# Patient Record
Sex: FEMALE | Race: WHITE | NOT HISPANIC OR LATINO | Employment: UNEMPLOYED | URBAN - METROPOLITAN AREA
[De-identification: names, ages, dates, MRNs, and addresses within clinical notes are randomized per-mention and may not be internally consistent; named-entity substitution may affect disease eponyms.]

---

## 2022-03-18 PROBLEM — Z34.90 PREGNANCY: Status: ACTIVE | Noted: 2019-12-27

## 2022-03-19 PROBLEM — Z34.90 TERM PREGNANCY: Status: ACTIVE | Noted: 2019-12-29

## 2023-01-14 ENCOUNTER — HOSPITAL ENCOUNTER (EMERGENCY)
Facility: HOSPITAL | Age: 28
Discharge: HOME | End: 2023-01-14
Attending: EMERGENCY MEDICINE
Payer: COMMERCIAL

## 2023-01-14 VITALS
SYSTOLIC BLOOD PRESSURE: 109 MMHG | HEIGHT: 61 IN | TEMPERATURE: 99 F | OXYGEN SATURATION: 100 % | RESPIRATION RATE: 20 BRPM | BODY MASS INDEX: 22.66 KG/M2 | HEART RATE: 79 BPM | DIASTOLIC BLOOD PRESSURE: 69 MMHG | WEIGHT: 120 LBS

## 2023-01-14 DIAGNOSIS — F32.9 MAJOR DEPRESSIVE DISORDER WITH CURRENT ACTIVE EPISODE, UNSPECIFIED DEPRESSION EPISODE SEVERITY, UNSPECIFIED WHETHER RECURRENT: Primary | ICD-10-CM

## 2023-01-14 DIAGNOSIS — F41.9 ANXIETY DISORDER, UNSPECIFIED TYPE: ICD-10-CM

## 2023-01-14 LAB
ALBUMIN SERPL BCP-MCNC: 4.8 G/DL (ref 3.5–5.2)
ALP SERPL-CCNC: 58 U/L (ref 55–135)
ALT SERPL W/O P-5'-P-CCNC: 36 U/L (ref 10–44)
AMPHET+METHAMPHET UR QL: NEGATIVE
ANION GAP SERPL CALC-SCNC: 11 MMOL/L (ref 8–16)
APAP SERPL-MCNC: <3 UG/ML (ref 10–20)
AST SERPL-CCNC: 27 U/L (ref 10–40)
B-HCG UR QL: NEGATIVE
BARBITURATES UR QL SCN>200 NG/ML: NEGATIVE
BASOPHILS # BLD AUTO: 0.01 K/UL (ref 0–0.2)
BASOPHILS NFR BLD: 0.1 % (ref 0–1.9)
BENZODIAZ UR QL SCN>200 NG/ML: NEGATIVE
BILIRUB SERPL-MCNC: 0.4 MG/DL (ref 0.1–1)
BILIRUB UR QL STRIP: NEGATIVE
BUN SERPL-MCNC: 8 MG/DL (ref 6–20)
BZE UR QL SCN: NEGATIVE
CALCIUM SERPL-MCNC: 10 MG/DL (ref 8.7–10.5)
CANNABINOIDS UR QL SCN: NEGATIVE
CHLORIDE SERPL-SCNC: 106 MMOL/L (ref 95–110)
CLARITY UR: CLEAR
CO2 SERPL-SCNC: 23 MMOL/L (ref 23–29)
COLOR UR: YELLOW
CREAT SERPL-MCNC: 0.7 MG/DL (ref 0.5–1.4)
CREAT UR-MCNC: 36 MG/DL (ref 15–325)
DIFFERENTIAL METHOD: ABNORMAL
EOSINOPHIL # BLD AUTO: 0 K/UL (ref 0–0.5)
EOSINOPHIL NFR BLD: 0.2 % (ref 0–8)
ERYTHROCYTE [DISTWIDTH] IN BLOOD BY AUTOMATED COUNT: 11.9 % (ref 11.5–14.5)
EST. GFR  (NO RACE VARIABLE): >60 ML/MIN/1.73 M^2
ETHANOL SERPL-MCNC: <10 MG/DL
GLUCOSE SERPL-MCNC: 94 MG/DL (ref 70–110)
GLUCOSE UR QL STRIP: NEGATIVE
HCT VFR BLD AUTO: 41.5 % (ref 37–48.5)
HGB BLD-MCNC: 14.2 G/DL (ref 12–16)
HGB UR QL STRIP: NEGATIVE
IMM GRANULOCYTES # BLD AUTO: 0.03 K/UL (ref 0–0.04)
IMM GRANULOCYTES NFR BLD AUTO: 0.3 % (ref 0–0.5)
KETONES UR QL STRIP: NEGATIVE
LEUKOCYTE ESTERASE UR QL STRIP: NEGATIVE
LYMPHOCYTES # BLD AUTO: 1.1 K/UL (ref 1–4.8)
LYMPHOCYTES NFR BLD: 11.3 % (ref 18–48)
MCH RBC QN AUTO: 31.6 PG (ref 27–31)
MCHC RBC AUTO-ENTMCNC: 34.2 G/DL (ref 32–36)
MCV RBC AUTO: 92 FL (ref 82–98)
METHADONE UR QL SCN>300 NG/ML: NEGATIVE
MONOCYTES # BLD AUTO: 0.3 K/UL (ref 0.3–1)
MONOCYTES NFR BLD: 3.5 % (ref 4–15)
NEUTROPHILS # BLD AUTO: 8.2 K/UL (ref 1.8–7.7)
NEUTROPHILS NFR BLD: 84.6 % (ref 38–73)
NITRITE UR QL STRIP: NEGATIVE
NRBC BLD-RTO: 0 /100 WBC
OPIATES UR QL SCN: NEGATIVE
PCP UR QL SCN>25 NG/ML: NEGATIVE
PH UR STRIP: 7 [PH] (ref 5–8)
PLATELET # BLD AUTO: 230 K/UL (ref 150–450)
PMV BLD AUTO: 9.5 FL (ref 9.2–12.9)
POTASSIUM SERPL-SCNC: 4.3 MMOL/L (ref 3.5–5.1)
PROT SERPL-MCNC: 8 G/DL (ref 6–8.4)
PROT UR QL STRIP: NEGATIVE
RBC # BLD AUTO: 4.5 M/UL (ref 4–5.4)
SARS-COV-2 RDRP RESP QL NAA+PROBE: NEGATIVE
SODIUM SERPL-SCNC: 140 MMOL/L (ref 136–145)
SP GR UR STRIP: 1 (ref 1–1.03)
TOXICOLOGY INFORMATION: NORMAL
TSH SERPL DL<=0.005 MIU/L-ACNC: 1.4 UIU/ML (ref 0.4–4)
URN SPEC COLLECT METH UR: NORMAL
UROBILINOGEN UR STRIP-ACNC: NEGATIVE EU/DL
WBC # BLD AUTO: 9.7 K/UL (ref 3.9–12.7)

## 2023-01-14 PROCEDURE — U0002 COVID-19 LAB TEST NON-CDC: HCPCS | Performed by: EMERGENCY MEDICINE

## 2023-01-14 PROCEDURE — 81025 URINE PREGNANCY TEST: CPT | Performed by: EMERGENCY MEDICINE

## 2023-01-14 PROCEDURE — 80053 COMPREHEN METABOLIC PANEL: CPT | Performed by: EMERGENCY MEDICINE

## 2023-01-14 PROCEDURE — 36415 COLL VENOUS BLD VENIPUNCTURE: CPT | Performed by: EMERGENCY MEDICINE

## 2023-01-14 PROCEDURE — 84443 ASSAY THYROID STIM HORMONE: CPT | Performed by: EMERGENCY MEDICINE

## 2023-01-14 PROCEDURE — G0427 INPT/ED TELECONSULT70: HCPCS | Mod: GT,,, | Performed by: STUDENT IN AN ORGANIZED HEALTH CARE EDUCATION/TRAINING PROGRAM

## 2023-01-14 PROCEDURE — 85025 COMPLETE CBC W/AUTO DIFF WBC: CPT | Performed by: EMERGENCY MEDICINE

## 2023-01-14 PROCEDURE — 80143 DRUG ASSAY ACETAMINOPHEN: CPT | Performed by: EMERGENCY MEDICINE

## 2023-01-14 PROCEDURE — 80307 DRUG TEST PRSMV CHEM ANLYZR: CPT | Performed by: EMERGENCY MEDICINE

## 2023-01-14 PROCEDURE — 82077 ASSAY SPEC XCP UR&BREATH IA: CPT | Performed by: EMERGENCY MEDICINE

## 2023-01-14 PROCEDURE — 81003 URINALYSIS AUTO W/O SCOPE: CPT | Mod: 59 | Performed by: EMERGENCY MEDICINE

## 2023-01-14 PROCEDURE — 99285 EMERGENCY DEPT VISIT HI MDM: CPT | Mod: 25

## 2023-01-14 PROCEDURE — G0427 PR INPT TELEHEALTH CON 70/>M: ICD-10-PCS | Mod: GT,,, | Performed by: STUDENT IN AN ORGANIZED HEALTH CARE EDUCATION/TRAINING PROGRAM

## 2023-01-14 NOTE — DISCHARGE INSTRUCTIONS
[facility] BEHAVIORAL HEALTH & ADDICTION RESOURCES      [place] [facility] Crisis Response Team:    Region 12 ([place], [place], [place], and [facility]) ([facility] and [facility])  [phone]       Outpatient Mental Health & Addiction Clinic Resources for both [facility] and [facility]:    [facility] Mental Healthcare Resources   Website: [email or web address]  Main Number: [phone]    [facility] ([facility])  [address] ([address] [facility]) [place] [address]  [phone]    [facility] ([facility])  [address] ([address] [facility]) [place] [address]  [phone]    [facility]   [address] ([address]) [place] [address]  [phone]    [facility]  [address] ([address] [facility]) [place] [address]  [phone]      Addiction Treatment Resources for both [facility] and [facility]:    [facility] (Detox, Residential, PHP, IOP, and Aftercare Programs)  [address] [place] [address]  [phone]    [facility] (Residential, IOP, Transitional Living, and Aftercare Programs)  [address] [facility] [place] [address]  [phone]    [facility] (Inpatient, Residential, Detox, IOP, Outpatient, and Aftercare Programs)  [address] [place] [address]  [phone] or toll free at [phone]      Outpatient Mental Health Psychotherapy Resources for both [facility] and [facility]:     Mar Gómez, LCSW  [address]  [place], [place] [address] [phone]  Specialties: Depression, Anxiety, and Life Transitions    Génesis Wong, PhD  [address]  [address]  [place] [address] [phone]  Specialties: Testing and Evaluation, Education and Learning Disabilities, and  ADHD    Nelida Epstein, [facility]  [facility]  [address] [place] [address]  [phone]  Specialties: Obsessive-Compulsive (OCD), Depression, and Relationship Issues    Nickie Harrison LPC  [address] [place] [address]  [phone]  Specialties: Trauma & PTSD, Mood Disorders, and Anxiety    Nickie Isaac, PhD, [facility]  [address] [place] [address]  [phone]  Specialties: Family Conflict, Child, and Relationship Issues    Carline Hardwick LPC  [facility] [address]  [phone]  Specialties: Anxiety, Depression, and Anger Management

## 2023-01-14 NOTE — ED NOTES
Discharge reviewed with pt. Instructed to follow up with psych and pcp, and return to the ED for worsening of symptoms. Pt voiced understanding, no distress noted.
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
## 2023-02-01 RX ORDER — ESCITALOPRAM OXALATE 20 MG/1
20 TABLET ORAL DAILY
COMMUNITY

## 2023-02-01 RX ORDER — IBUPROFEN 600 MG/1
600 TABLET ORAL EVERY 6 HOURS PRN
COMMUNITY
Start: 2019-12-31

## 2023-02-01 RX ORDER — OMEPRAZOLE 10 MG/1
10 CAPSULE, DELAYED RELEASE ORAL DAILY
COMMUNITY

## 2023-11-07 ENCOUNTER — OFFICE VISIT (OUTPATIENT)
Dept: OBSTETRICS AND GYNECOLOGY | Facility: CLINIC | Age: 28
End: 2023-11-07
Payer: MEDICAID

## 2023-11-07 VITALS
HEIGHT: 61 IN | BODY MASS INDEX: 20.39 KG/M2 | DIASTOLIC BLOOD PRESSURE: 70 MMHG | WEIGHT: 108 LBS | HEART RATE: 74 BPM | SYSTOLIC BLOOD PRESSURE: 100 MMHG

## 2023-11-07 DIAGNOSIS — R55 SYNCOPE, UNSPECIFIED SYNCOPE TYPE: ICD-10-CM

## 2023-11-07 DIAGNOSIS — Z3A.21 21 WEEKS GESTATION OF PREGNANCY: Primary | ICD-10-CM

## 2023-11-07 PROCEDURE — 3078F PR MOST RECENT DIASTOLIC BLOOD PRESSURE < 80 MM HG: ICD-10-PCS | Mod: CPTII,,, | Performed by: STUDENT IN AN ORGANIZED HEALTH CARE EDUCATION/TRAINING PROGRAM

## 2023-11-07 PROCEDURE — 3074F SYST BP LT 130 MM HG: CPT | Mod: CPTII,,, | Performed by: STUDENT IN AN ORGANIZED HEALTH CARE EDUCATION/TRAINING PROGRAM

## 2023-11-07 PROCEDURE — 99999 PR PBB SHADOW E&M-EST. PATIENT-LVL III: ICD-10-PCS | Mod: PBBFAC,,, | Performed by: STUDENT IN AN ORGANIZED HEALTH CARE EDUCATION/TRAINING PROGRAM

## 2023-11-07 PROCEDURE — 87086 URINE CULTURE/COLONY COUNT: CPT | Performed by: STUDENT IN AN ORGANIZED HEALTH CARE EDUCATION/TRAINING PROGRAM

## 2023-11-07 PROCEDURE — 99204 OFFICE O/P NEW MOD 45 MIN: CPT | Mod: TH,S$PBB,, | Performed by: STUDENT IN AN ORGANIZED HEALTH CARE EDUCATION/TRAINING PROGRAM

## 2023-11-07 PROCEDURE — 99213 OFFICE O/P EST LOW 20 MIN: CPT | Mod: PBBFAC,PO | Performed by: STUDENT IN AN ORGANIZED HEALTH CARE EDUCATION/TRAINING PROGRAM

## 2023-11-07 PROCEDURE — 1159F MED LIST DOCD IN RCRD: CPT | Mod: CPTII,,, | Performed by: STUDENT IN AN ORGANIZED HEALTH CARE EDUCATION/TRAINING PROGRAM

## 2023-11-07 PROCEDURE — 3074F PR MOST RECENT SYSTOLIC BLOOD PRESSURE < 130 MM HG: ICD-10-PCS | Mod: CPTII,,, | Performed by: STUDENT IN AN ORGANIZED HEALTH CARE EDUCATION/TRAINING PROGRAM

## 2023-11-07 PROCEDURE — 1160F RVW MEDS BY RX/DR IN RCRD: CPT | Mod: CPTII,,, | Performed by: STUDENT IN AN ORGANIZED HEALTH CARE EDUCATION/TRAINING PROGRAM

## 2023-11-07 PROCEDURE — 1160F PR REVIEW ALL MEDS BY PRESCRIBER/CLIN PHARMACIST DOCUMENTED: ICD-10-PCS | Mod: CPTII,,, | Performed by: STUDENT IN AN ORGANIZED HEALTH CARE EDUCATION/TRAINING PROGRAM

## 2023-11-07 PROCEDURE — 99999 PR PBB SHADOW E&M-EST. PATIENT-LVL III: CPT | Mod: PBBFAC,,, | Performed by: STUDENT IN AN ORGANIZED HEALTH CARE EDUCATION/TRAINING PROGRAM

## 2023-11-07 PROCEDURE — 1159F PR MEDICATION LIST DOCUMENTED IN MEDICAL RECORD: ICD-10-PCS | Mod: CPTII,,, | Performed by: STUDENT IN AN ORGANIZED HEALTH CARE EDUCATION/TRAINING PROGRAM

## 2023-11-07 PROCEDURE — 3008F PR BODY MASS INDEX (BMI) DOCUMENTED: ICD-10-PCS | Mod: CPTII,,, | Performed by: STUDENT IN AN ORGANIZED HEALTH CARE EDUCATION/TRAINING PROGRAM

## 2023-11-07 PROCEDURE — 3008F BODY MASS INDEX DOCD: CPT | Mod: CPTII,,, | Performed by: STUDENT IN AN ORGANIZED HEALTH CARE EDUCATION/TRAINING PROGRAM

## 2023-11-07 PROCEDURE — 99204 PR OFFICE/OUTPT VISIT, NEW, LEVL IV, 45-59 MIN: ICD-10-PCS | Mod: TH,S$PBB,, | Performed by: STUDENT IN AN ORGANIZED HEALTH CARE EDUCATION/TRAINING PROGRAM

## 2023-11-07 PROCEDURE — 3078F DIAST BP <80 MM HG: CPT | Mod: CPTII,,, | Performed by: STUDENT IN AN ORGANIZED HEALTH CARE EDUCATION/TRAINING PROGRAM

## 2023-11-07 RX ORDER — CLONAZEPAM 0.5 MG/1
0.5 TABLET ORAL DAILY PRN
COMMUNITY
Start: 2023-09-21

## 2023-11-07 RX ORDER — ESCITALOPRAM OXALATE 20 MG/1
20 TABLET ORAL
COMMUNITY
Start: 2023-09-21 | End: 2023-11-21 | Stop reason: SDUPTHER

## 2023-11-07 RX ORDER — LAMOTRIGINE 25 MG/1
50 TABLET ORAL NIGHTLY
COMMUNITY
Start: 2023-10-26

## 2023-11-07 RX ORDER — ONDANSETRON 4 MG/1
4 TABLET, FILM COATED ORAL EVERY 6 HOURS PRN
COMMUNITY
Start: 2023-10-12 | End: 2023-12-05

## 2023-11-07 NOTE — PROGRESS NOTES
"OB INTAKE APPOINTMENT    28-YO female presents today for her OB Intake Appointment.    Went to the ER due to fainting spells, worsening migraines and "completely blacking out".   She has not had these symptoms in her prior pregnancies. "Fainting spells are associated with chest pain, tunnel vision, and heat sensation" through her head.  Some of the symptoms were present before this pregnancy.   She has nausea after the episode.   She denies any underlying cardiac issues. She has not had these issues evaluated.   Taking klonopin PRN for anxiety attacks.   Lamictal daily managed by psychiatry for mood stability.     --------------------------------------------------------------------------------     PREGNANCY DATING:    Pregnancy test today = N/A     LMP 5-1-23 ---gives EDC---> Feb 5, 2024 ----> 27 w 1 d wks EGA today    Based on US results:   ROMAN MAR 18, 2024  EGA: 21 w 1 d     Sure LMP: No  Prior US done: Yes  Other information relevant to dating (sure conception date, IVF date, etc.): No      Fundal height: 21 cm  HT with Doppler: 130-140s.    Recent OB US: 9-14-23    Narrative    EXAM: OBSTETRICAL ULTRASOUND LESS than 14 weeks     History: , Syncopal episode with fall, now having abdominal cramping, ,     COMPARISON STUDIES AVAILABLE:  09/08/2023     Transabdominal ultrasound performed.     Findings:  single living intrauterine pregnancy   Presentation:  Transverse   Placenta:  Posterior   CERVIX: Closed 3.5 cm   Biometric Data:                               Head circumference:  9.4 cm                             Abdominal circumference:  7.2 cm                             Femur length:  0.99 cm                             Humerus length:  1.12 cm   Average ultrasound age:  13 weeks 3 days   Estimated Date of Delivery by US:  03/18/2024   Estimated fetal weight:  74.8 g   Amniotic Fluid:  Normal, AMY was not obtained   Fetal Growth Indices:  Normal   Heart Rate:  158 BPM     Fetal anatomic survey demonstrates " intracranial contents to be within normal limits. 4 chambered heart noted with normal right and left ventricular outflow tracts. Fetal spine unremarkable. Left-sided stomach. Limited visualization of the kidneys with no abnormality identified. Three-vessel cord with normal cord insertion. Both are normal. Urinary bladder normal. Both ovaries normal in size and appearance. No free fluid.     Maternal adnexa obscured by gestation..    OB HISTORY:    Term deliveries: 3    (<37wks) deliveries: 0   Vaginal deliveries: 3   C-sections: 0   Miscarriages (<20wks): 0   Stillbirths (>20wks): 0   Medical Terminations: 0   Surgical Terminations: 0   Ectopic pregnancies: 0   Living children: 2      =      MEDICATIONS CURRENTLY TAKING:  Promethazine 25 mg tablet   Lexapro 20 mg  Klonopin 0.5 mg PRN  Lamictal 25 mg      CARRIER SCREENING PREVIOUSLY DONE:  Cystic Fibrosis, Spinal Muscular Atrophy, Fragile X:  Prior screen reviewed.  The patient was negative for CF, SMA, and Fragile X.  Hemoglobinopathies: Prior testing reviewed.  The patient has no known hemoglobinopathies.  Other: N/A     FAMILY LINEAGE AND HISTORY:  Ashkenazi or North American Advent:  No  Intellectual disability:  No  Premature ovarian failure (<40yoa):  No  Cajun or Luxembourger Gilson:  No    CERVICAL CANCER SCREENING:  Latest PAP and/or HPV result: Low grade squamous intraepithelial lesion (Date: 7-)  Has the patient had any prior abnormal tests? Yes, she was told to come in and repeat a pap smear. They did not repeat a pap smear.      MISCELLANEOUS:  Does the patient have cats? No     --------------------------------------------------------------------------------     ASSESMENT & PLAN:  Pregnancy confirmed today with a positive pregnancy test.  Limited patient history and chart review completed as above.    Gave patient our OB Welcome Packet and reviewed its contents.  Our discussion included:  an overview of appointments, hydration /  "nutrition / weight gain advice, safe OTC medications, what substances and exposures to avoid, vaccine recommendations, advice for morning sickness, dental hygiene advice, recommendations regarding exercise, advice for travel in pregnancy, information about breastfeeding, postpartum birth control, and circumcision, pediatricians in the community, WIC enrollment, how to contact us for concerns or problems during pregnancy, warning or danger signs.  Also provided [facility]'s publication, "Your Pregnancy from A-Z."    Advised patient to enroll in [employer] if not already done.    Advised patient to start a prenatal vitamin if not already taking.  It should contain 600mcg folic acid, 27mg iron, and 200mg DHA.  Other medication management: continue Klonopin, lexapro and Lamictal.    The patient watched [facility]'s video about Genetic and Carrier Screening options in pregnancy.  Afterwards, this testing was discussed in-person, and she had an opportunity to ask questions.  She was encouraged to consider these optional tests and to review the detailed information available in the OB Welcome Packet. If she would like this testing done, we will order it at her NOB appointment (usually ~12wks).    Routine OB labs were ordered, and the patient was directed to the lab.    A second trimester anatomy ultrasound was ordered and scheduled for 11-14-23.    The patient filled out a complete medical history form, which will be reviewed by one of our physicians after this appointment.  Most patients will be scheduled for a New OB appointment with Dr. Arias or Dr. Peng around 12wks EGA.  Dr. Peng and came in to discuss the patient's symptoms. Since some of the issues have been present before pregnancy, there is a possibility there could be an underlying heart issue. Urgent referral to cardiology has been placed.   If the patient needs to be seen for care prior to that (based on her responses on the history form), our office will reach out " to her and make adjustments.      Melanie Melchor PA-C  11/7/2023

## 2023-11-08 ENCOUNTER — HOSPITAL ENCOUNTER (EMERGENCY)
Facility: HOSPITAL | Age: 28
Discharge: HOME | End: 2023-11-09
Attending: STUDENT IN AN ORGANIZED HEALTH CARE EDUCATION/TRAINING PROGRAM
Payer: MEDICAID

## 2023-11-08 DIAGNOSIS — R55 VASOVAGAL SYNCOPE: ICD-10-CM

## 2023-11-08 DIAGNOSIS — Z34.92 SECOND TRIMESTER PREGNANCY: Primary | ICD-10-CM

## 2023-11-08 LAB
ALBUMIN SERPL BCP-MCNC: 3.7 G/DL (ref 3.5–5.2)
ALP SERPL-CCNC: 35 U/L (ref 55–135)
ALT SERPL W/O P-5'-P-CCNC: 12 U/L (ref 10–44)
ANION GAP SERPL CALC-SCNC: 6 MMOL/L (ref 8–16)
AST SERPL-CCNC: 13 U/L (ref 10–40)
B-HCG UR QL: POSITIVE
BACTERIA #/AREA URNS HPF: ABNORMAL /HPF
BACTERIA UR CULT: NO GROWTH
BASOPHILS # BLD AUTO: 0.01 K/UL (ref 0–0.2)
BASOPHILS NFR BLD: 0.1 % (ref 0–1.9)
BILIRUB SERPL-MCNC: 0.2 MG/DL (ref 0.1–1)
BILIRUB UR QL STRIP: NEGATIVE
BUN SERPL-MCNC: 5 MG/DL (ref 6–20)
CALCIUM SERPL-MCNC: 8.9 MG/DL (ref 8.7–10.5)
CHLORIDE SERPL-SCNC: 104 MMOL/L (ref 95–110)
CLARITY UR: ABNORMAL
CO2 SERPL-SCNC: 23 MMOL/L (ref 23–29)
COLOR UR: YELLOW
CREAT SERPL-MCNC: 0.5 MG/DL (ref 0.5–1.4)
CTP QC/QA: YES
DIFFERENTIAL METHOD: ABNORMAL
EOSINOPHIL # BLD AUTO: 0.1 K/UL (ref 0–0.5)
EOSINOPHIL NFR BLD: 0.9 % (ref 0–8)
ERYTHROCYTE [DISTWIDTH] IN BLOOD BY AUTOMATED COUNT: 12.6 % (ref 11.5–14.5)
EST. GFR  (NO RACE VARIABLE): >60 ML/MIN/1.73 M^2
GLUCOSE SERPL-MCNC: 99 MG/DL (ref 70–110)
GLUCOSE UR QL STRIP: ABNORMAL
HCT VFR BLD AUTO: 32.4 % (ref 37–48.5)
HGB BLD-MCNC: 11.5 G/DL (ref 12–16)
HGB UR QL STRIP: NEGATIVE
HYALINE CASTS #/AREA URNS LPF: 7 /LPF
IMM GRANULOCYTES # BLD AUTO: 0.04 K/UL (ref 0–0.04)
IMM GRANULOCYTES NFR BLD AUTO: 0.5 % (ref 0–0.5)
KETONES UR QL STRIP: NEGATIVE
LEUKOCYTE ESTERASE UR QL STRIP: ABNORMAL
LIPASE SERPL-CCNC: 16 U/L (ref 4–60)
LYMPHOCYTES # BLD AUTO: 1.7 K/UL (ref 1–4.8)
LYMPHOCYTES NFR BLD: 22.8 % (ref 18–48)
MCH RBC QN AUTO: 33.5 PG (ref 27–31)
MCHC RBC AUTO-ENTMCNC: 35.5 G/DL (ref 32–36)
MCV RBC AUTO: 95 FL (ref 82–98)
MICROSCOPIC COMMENT: ABNORMAL
MONOCYTES # BLD AUTO: 0.6 K/UL (ref 0.3–1)
MONOCYTES NFR BLD: 8.1 % (ref 4–15)
NEUTROPHILS # BLD AUTO: 5.1 K/UL (ref 1.8–7.7)
NEUTROPHILS NFR BLD: 67.6 % (ref 38–73)
NITRITE UR QL STRIP: NEGATIVE
NRBC BLD-RTO: 0 /100 WBC
PH UR STRIP: 7 [PH] (ref 5–8)
PLATELET # BLD AUTO: 215 K/UL (ref 150–450)
PMV BLD AUTO: 8.9 FL (ref 9.2–12.9)
POTASSIUM SERPL-SCNC: 3.3 MMOL/L (ref 3.5–5.1)
PROT SERPL-MCNC: 6.4 G/DL (ref 6–8.4)
PROT UR QL STRIP: NEGATIVE
RBC # BLD AUTO: 3.43 M/UL (ref 4–5.4)
RBC #/AREA URNS HPF: 1 /HPF (ref 0–4)
SODIUM SERPL-SCNC: 133 MMOL/L (ref 136–145)
SP GR UR STRIP: 1.01 (ref 1–1.03)
SQUAMOUS #/AREA URNS HPF: 28 /HPF
URN SPEC COLLECT METH UR: ABNORMAL
UROBILINOGEN UR STRIP-ACNC: NEGATIVE EU/DL
WBC # BLD AUTO: 7.5 K/UL (ref 3.9–12.7)
WBC #/AREA URNS HPF: 4 /HPF (ref 0–5)

## 2023-11-08 PROCEDURE — 85025 COMPLETE CBC W/AUTO DIFF WBC: CPT

## 2023-11-08 PROCEDURE — 80053 COMPREHEN METABOLIC PANEL: CPT

## 2023-11-08 PROCEDURE — 81001 URINALYSIS AUTO W/SCOPE: CPT | Performed by: STUDENT IN AN ORGANIZED HEALTH CARE EDUCATION/TRAINING PROGRAM

## 2023-11-08 PROCEDURE — 81025 URINE PREGNANCY TEST: CPT | Performed by: STUDENT IN AN ORGANIZED HEALTH CARE EDUCATION/TRAINING PROGRAM

## 2023-11-08 PROCEDURE — 25000003 PHARM REV CODE 250: Mod: UD | Performed by: STUDENT IN AN ORGANIZED HEALTH CARE EDUCATION/TRAINING PROGRAM

## 2023-11-08 PROCEDURE — 83690 ASSAY OF LIPASE: CPT

## 2023-11-08 RX ORDER — ACETAMINOPHEN 500 MG
1000 TABLET ORAL
Status: COMPLETED | OUTPATIENT
Start: 2023-11-09 | End: 2023-11-09

## 2023-11-08 RX ADMIN — SODIUM CHLORIDE 1000 ML: 0.9 INJECTION, SOLUTION INTRAVENOUS at 11:11

## 2023-11-09 ENCOUNTER — PATIENT MESSAGE (OUTPATIENT)
Dept: OBSTETRICS AND GYNECOLOGY | Facility: CLINIC | Age: 28
End: 2023-11-09
Payer: MEDICAID

## 2023-11-09 VITALS
DIASTOLIC BLOOD PRESSURE: 66 MMHG | OXYGEN SATURATION: 98 % | TEMPERATURE: 98 F | SYSTOLIC BLOOD PRESSURE: 94 MMHG | BODY MASS INDEX: 20.2 KG/M2 | HEART RATE: 78 BPM | WEIGHT: 107 LBS | RESPIRATION RATE: 20 BRPM | HEIGHT: 61 IN

## 2023-11-09 PROCEDURE — 96360 HYDRATION IV INFUSION INIT: CPT

## 2023-11-09 PROCEDURE — 25000003 PHARM REV CODE 250

## 2023-11-09 PROCEDURE — 99284 EMERGENCY DEPT VISIT MOD MDM: CPT | Mod: 25

## 2023-11-09 RX ADMIN — ACETAMINOPHEN 1000 MG: 500 TABLET ORAL at 12:11

## 2023-11-09 NOTE — ED PROVIDER NOTES
"Encounter Date: 2023       History     Chief Complaint   Patient presents with    Abdominal Pain     Abdominal cramping she was cleared by L&D     28-year-old female  at proximally 22 weeks gestational age presents now for syncopal episode and abdominal cramping.  She has history of anxiety and rubin quit syncopal episodes preceded by dizziness, lightheadedness, feeling hot and flushed.  Today, she was anxious for unknown reason, went to her friend's house, started to feel dizzy, walked to the kitchen, felt flushed, lightheaded and passed out.  She awoke soon after and has felt normal since aside from intermittent "tunnel vision".       Review of patient's allergies indicates:   Allergen Reactions    Clindamycin Other (See Comments) and Rash     Past Medical History:   Diagnosis Date    Abnormal Pap smear of cervix      No past surgical history on file.  No family history on file.  Social History     Tobacco Use    Smoking status: Some Days     Types: Vaping with nicotine    Smokeless tobacco: Never   Substance Use Topics    Alcohol use: Not Currently    Drug use: Never     Review of Systems   Constitutional:  Negative for activity change, appetite change, chills, fever and unexpected weight change.   HENT:  Negative for dental problem and drooling.    Eyes:  Negative for discharge and itching.   Respiratory:  Negative for cough, chest tightness, shortness of breath, wheezing and stridor.    Cardiovascular:  Negative for chest pain, palpitations and leg swelling.   Gastrointestinal:  Positive for abdominal distention. Negative for abdominal pain, diarrhea and nausea.   Genitourinary:  Negative for difficulty urinating, dysuria, frequency and urgency.   Musculoskeletal:  Negative for back pain, gait problem and joint swelling.   Neurological:  Positive for syncope. Negative for dizziness, numbness and headaches.   Psychiatric/Behavioral:  Negative for agitation, behavioral problems and confusion.  "       Physical Exam     Initial Vitals [11/08/23 2212]   BP Pulse Resp Temp SpO2   103/65 81 20 98.1 °F (36.7 °C) 99 %      MAP       --         Physical Exam    Nursing note and vitals reviewed.  Constitutional: She appears well-developed and well-nourished. She is not diaphoretic.   HENT:   Head: Normocephalic and atraumatic.   Mouth/Throat: Oropharynx is clear and moist.   Eyes: EOM are normal. Pupils are equal, round, and reactive to light. Right eye exhibits no discharge. Left eye exhibits no discharge.   Neck: No tracheal deviation present.   Normal range of motion.  Cardiovascular:  Normal rate, regular rhythm and intact distal pulses.           Pulmonary/Chest: No respiratory distress. She has no wheezes. She exhibits no tenderness.   Abdominal: Abdomen is soft. She exhibits distension. She exhibits no mass. There is no abdominal tenderness.   Gravid, soft uterus There is no rebound and no guarding.   Musculoskeletal:         General: No tenderness or edema. Normal range of motion.      Cervical back: Normal range of motion.     Neurological: She is alert and oriented to person, place, and time. She has normal strength and normal reflexes. No cranial nerve deficit or sensory deficit. Coordination and gait normal. GCS eye subscore is 4. GCS verbal subscore is 5. GCS motor subscore is 6.   Normal finger to nose, heel to shin, rapid alternating movement.    Skin: Skin is warm and dry. No rash noted.   Psychiatric: She has a normal mood and affect. Her behavior is normal. Thought content normal.         ED Course   Procedures  Labs Reviewed   CBC W/ AUTO DIFFERENTIAL - Abnormal; Notable for the following components:       Result Value    RBC 3.43 (*)     Hemoglobin 11.5 (*)     Hematocrit 32.4 (*)     MCH 33.5 (*)     MPV 8.9 (*)     All other components within normal limits   COMPREHENSIVE METABOLIC PANEL - Abnormal; Notable for the following components:    Sodium 133 (*)     Potassium 3.3 (*)     BUN 5 (*)      Alkaline Phosphatase 35 (*)     Anion Gap 6 (*)     All other components within normal limits   URINALYSIS, REFLEX TO URINE CULTURE - Abnormal; Notable for the following components:    Appearance, UA Hazy (*)     Glucose, UA 1+ (*)     Leukocytes, UA Trace (*)     All other components within normal limits    Narrative:     Specimen Source->Urine   URINALYSIS MICROSCOPIC - Abnormal; Notable for the following components:    Hyaline Casts, UA 7 (*)     All other components within normal limits    Narrative:     Specimen Source->Urine   POCT URINE PREGNANCY - Abnormal; Notable for the following components:    POC Preg Test, Ur Positive (*)     All other components within normal limits   LIPASE   LIPASE          Imaging Results    None          Medications   sodium chloride 0.9% bolus 1,000 mL 1,000 mL (0 mLs Intravenous Stopped 11/9/23 0056)   acetaminophen tablet 1,000 mg (1,000 mg Oral Given 11/9/23 0001)     Medical Decision Making  Amount and/or Complexity of Data Reviewed  Labs: ordered.                             28-year-old female at 22 weeks gestational age presents for evaluation of syncope and lower abdominal cramping.  Initially she was seen at  and D, discharged after reassuring fetal heart tones and NST.  Vitals within normal limits.      Differential diagnosis includes vasovagal, anemia, arrhythmia, valvular disease, hypoglycemia, seizure.     After complete evaluation, including thorough history and physical exam, I feel the patient's symptoms are due to benign cause of syncope, most likely vasovagal. The episode was temporary, short in duration, and the patient is awake and back to baseline mental status. The history and presentation is inconsistent with seizure. Cardiac evaluation does not suggest arrhythmia, and patient's vitals have remained stable. Patient does not have history of congestive heart failure, the patient is not elderly, they have no history of dialysis or renal failure to suggest  high-risk syncope. The patient denies any concerning associated symptoms. Patient denies any headache, and presentation is inconsistent with SAH/intracranial bleed. Physical exam is benign without focal weakness, sensory deficit, or cerebellar signs to suggest stroke or intracranial mass. There is no meningismus, fever, or evidence of infection to suggest infection, meningitis/encephalitis. There's no history of sudden cardiac death or otherwise unexplained death at a young age to suggest familial cardiac disease. Hgb within normal limits, doubt life threatening anemia. There's no abdominal pain to suggest ruptured AAA.     Patient was advised to follow-up with their PCP in 2-3 days. Patient has maintained a normal blood pressure during ED stay. Appropriate discharge instructions and return precautions were given. Patient and any present family expressed understanding.Patient was advised to follow-up with their PCP in 2-3 days. Appropriate discharge instructions and return precautions were given. Patient and any present family expressed understanding.    Clinical Impression:   Final diagnoses:  [R55] Vasovagal syncope  [Z34.92] Second trimester pregnancy (Primary)        ED Disposition Condition    Discharge Stable          ED Prescriptions    None       Follow-up Information       Follow up With Specialties Details Why Contact Info    Pilo Peng MD Obstetrics and Gynecology Call in 1 day To recheck today's symptoms, To set up a follow-up appointment [facility]  [address]  [facility] [address]  [phone]               Daron Fontaine MD  11/09/23 0344

## 2023-11-09 NOTE — TELEPHONE ENCOUNTER
Contacted pt and notified her that her appt with us next week is the soonest we have available and she voiced understanding. Also explained that she can contact her insurance company to get a list of covered cardiologist and we can create a referral for a covered provider. Pt voiced understanding.

## 2023-11-09 NOTE — DISCHARGE INSTRUCTIONS

## 2023-11-14 ENCOUNTER — HOSPITAL ENCOUNTER (OUTPATIENT)
Dept: OBSTETRICS AND GYNECOLOGY | Facility: CLINIC | Age: 28
Discharge: HOME | End: 2023-11-14
Payer: MEDICAID

## 2023-11-14 ENCOUNTER — LAB VISIT (OUTPATIENT)
Dept: LAB | Facility: HOSPITAL | Age: 28
End: 2023-11-14
Attending: STUDENT IN AN ORGANIZED HEALTH CARE EDUCATION/TRAINING PROGRAM
Payer: MEDICAID

## 2023-11-14 DIAGNOSIS — Z3A.21 21 WEEKS GESTATION OF PREGNANCY: ICD-10-CM

## 2023-11-14 PROCEDURE — 83020 HEMOGLOBIN ELECTROPHORESIS: CPT | Performed by: STUDENT IN AN ORGANIZED HEALTH CARE EDUCATION/TRAINING PROGRAM

## 2023-11-14 PROCEDURE — 76805 US OB/GYN EXTENDED PROCEDURE (VIEWPOINT): ICD-10-PCS | Mod: 26,,, | Performed by: OBSTETRICS & GYNECOLOGY

## 2023-11-14 PROCEDURE — 36415 COLL VENOUS BLD VENIPUNCTURE: CPT | Mod: PO | Performed by: STUDENT IN AN ORGANIZED HEALTH CARE EDUCATION/TRAINING PROGRAM

## 2023-11-14 PROCEDURE — 76805 OB US >/= 14 WKS SNGL FETUS: CPT | Mod: 26,,, | Performed by: OBSTETRICS & GYNECOLOGY

## 2023-11-15 ENCOUNTER — INITIAL PRENATAL (OUTPATIENT)
Dept: OBSTETRICS AND GYNECOLOGY | Facility: CLINIC | Age: 28
End: 2023-11-15
Payer: MEDICAID

## 2023-11-15 VITALS
BODY MASS INDEX: 20.74 KG/M2 | DIASTOLIC BLOOD PRESSURE: 70 MMHG | WEIGHT: 109.81 LBS | SYSTOLIC BLOOD PRESSURE: 102 MMHG

## 2023-11-15 DIAGNOSIS — Z3A.22 22 WEEKS GESTATION OF PREGNANCY: Primary | ICD-10-CM

## 2023-11-15 DIAGNOSIS — L98.9 SKIN LESIONS: ICD-10-CM

## 2023-11-15 DIAGNOSIS — R55 SYNCOPE, UNSPECIFIED SYNCOPE TYPE: ICD-10-CM

## 2023-11-15 LAB
HGB A2 MFR BLD HPLC: 2.8 % (ref 2.2–3.2)
HGB FRACT BLD ELPH-IMP: NORMAL
HGB FRACT BLD ELPH-IMP: NORMAL

## 2023-11-15 PROCEDURE — 99999 PR PBB SHADOW E&M-EST. PATIENT-LVL III: ICD-10-PCS | Mod: PBBFAC,,, | Performed by: OBSTETRICS & GYNECOLOGY

## 2023-11-15 PROCEDURE — 99213 OFFICE O/P EST LOW 20 MIN: CPT | Mod: PBBFAC,PO | Performed by: OBSTETRICS & GYNECOLOGY

## 2023-11-15 PROCEDURE — 87491 CHLMYD TRACH DNA AMP PROBE: CPT | Performed by: OBSTETRICS & GYNECOLOGY

## 2023-11-15 PROCEDURE — 99215 OFFICE O/P EST HI 40 MIN: CPT | Mod: TH,S$PBB,, | Performed by: OBSTETRICS & GYNECOLOGY

## 2023-11-15 PROCEDURE — 99215 PR OFFICE/OUTPT VISIT, EST, LEVL V, 40-54 MIN: ICD-10-PCS | Mod: TH,S$PBB,, | Performed by: OBSTETRICS & GYNECOLOGY

## 2023-11-15 PROCEDURE — 99999 PR PBB SHADOW E&M-EST. PATIENT-LVL III: CPT | Mod: PBBFAC,,, | Performed by: OBSTETRICS & GYNECOLOGY

## 2023-11-16 ENCOUNTER — TELEPHONE (OUTPATIENT)
Dept: CARDIOLOGY | Facility: CLINIC | Age: 28
End: 2023-11-16
Payer: MEDICAID

## 2023-11-16 ENCOUNTER — TELEPHONE (OUTPATIENT)
Dept: OBSTETRICS AND GYNECOLOGY | Facility: CLINIC | Age: 28
End: 2023-11-16
Payer: MEDICAID

## 2023-11-16 LAB
C TRACH DNA SPEC QL NAA+PROBE: NOT DETECTED
N GONORRHOEA DNA SPEC QL NAA+PROBE: NOT DETECTED

## 2023-11-16 NOTE — TELEPHONE ENCOUNTER
----- Message from Hollie Ibarra sent at 11/16/2023  4:00 PM CST -----  Regarding: sooner apt  Contact: pt  Type:  Sooner Appointment Request    Caller is requesting a sooner appointment.  Caller declined first available appointment listed below.  Caller will not accept being placed on the waitlist and is requesting a message be sent to doctor.    Name of Caller:  Patient  When is the first available appointment?  r  Symptoms:  referral  Would the patient rather a call back or a response via [facility]?   [facility] Call Back Number:  [phone]    Additional Information:  call to have pt scheduled thanks!

## 2023-11-16 NOTE — TELEPHONE ENCOUNTER
Left message on voicemail to contact office in regard to cardiology appt.  Scheduled pt with Dr Cruz, cardiologist, on December 7 @ 10:00.  Demographics faxed to office at [phone].

## 2023-11-16 NOTE — TELEPHONE ENCOUNTER
Pt returned call and was notified of cardiology appt 12/7/23 @ 10:00. Phone number and address also provided to pt.  Pt voiced understanding.

## 2023-11-16 NOTE — TELEPHONE ENCOUNTER
Spoke to pt and advised clinic does not take [place] Medicaid. Pt already has appt with Dr Cruz in [place]

## 2023-11-17 ENCOUNTER — PATIENT MESSAGE (OUTPATIENT)
Dept: OBSTETRICS AND GYNECOLOGY | Facility: CLINIC | Age: 28
End: 2023-11-17
Payer: MEDICAID

## 2023-11-17 ENCOUNTER — TELEPHONE (OUTPATIENT)
Dept: OBSTETRICS AND GYNECOLOGY | Facility: CLINIC | Age: 28
End: 2023-11-17
Payer: MEDICAID

## 2023-11-17 RX ORDER — ASPIRIN 81 MG/1
81 TABLET ORAL DAILY
Qty: 90 TABLET | Refills: 1 | Status: ON HOLD | OUTPATIENT
Start: 2023-11-17 | End: 2024-03-04 | Stop reason: HOSPADM

## 2023-11-17 NOTE — TELEPHONE ENCOUNTER
----- Message from Pilo Peng MD sent at 11/17/2023  9:59 AM CST -----  Regarding: [employer] or other aneuploidy screening?  The patient's medical record is somewhat confusing as she is a late transfer.  Can you contact this patient and find out whether she had aneuploidy screening during this pregnancy (the test which gives the baby sex as well as chromosome analysis of the fetus)  I believe she had carrier screening done with previous pregnancies.

## 2023-11-17 NOTE — TELEPHONE ENCOUNTER
Contacted pt and notified she has a cardiology appt scheduled 11/22 with Dr Mead.  Pt voiced understanding. Also asked pt about screening test.  Pt states she did have a [employer] carrier screening done with this pregnancy through [facility].  She is looking into getting those results.

## 2023-11-17 NOTE — PROGRESS NOTES
Prenatal Note   Chief Complaint:  Initial Prenatal Visit       Patient ID: Josefa Oliver is a  28 y.o. female.    Date: 11/15/2023    INITIAL OB EVALUATION    S/ 28 y.o. at 22-1/7 weeks who presents for her initial OB evaluation.    Appropriate fetal movement noted.    No significant contractions.  No vaginal bleeding or rupture of membranes.    She currently reports continued issues with syncopal episodes.  She has been seen in the emergency room recently and was diagnosed with vasovagal issues.  To some extent these syncopal episodes predates the pregnancy.  Cardiology consultation was ordered but there have been some insurance issues.    In addition the patient has a long history of depression.  She is under the care of TOM Leiav in [facility] ([phone]).    She reports a skin lesion on her back by her left shoulder which has been increasing in size as well as a new lesion on her left thigh.      O/  VITALS:  Weight:  110 lb  BP:  102/70    FH: 22 cm  FHT'S: 150's bpm by doppler    The patient has an approximately 5 mm to 10 mm raised pinkish lesion on her back at the site of her left shoulder.  In addition there was a smaller similar lesion on her left thigh.  .    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    The patient's chart as well as past medical surgical and obstetrical history were reviewed and discussed with the patient.     We discussed the results of the patient's ultrasound and recent prenatal labs.   We reviewed her last menstrual and viability dating ultrasound  results.  We discussed her estimated due date of:  3/19/2024 which was based on a first-trimester ultrasound.  We discussed plans for follow-up ultrasound.    Blood testing for aneuploidy screening as well as carrier status was reviewed and discussed.  The patient reports negative carrier screening as well as negative aneuploidy screening with this pregnancy.    At today's visit, the patient completed the OB patient aspirin questionnaire.  Based on the results of the question air the patient is a candidate for aspirin prophylaxis due to:  A family history of preeclampsia (mother and sister) and the fact that she weighed less than 5-1/2 lb at the time of her personal premature delivery.    The patient was given the [place] Prenatal /  depression scale test today.    She scored:  Significantly high.  This is primarily to the fact that tomorrow is the birthday of the patient's oldest child who is now .  She is currently under the care of psychiatry and at this time denies any suicidal or homicidal thoughts.  She is currently on antidepressant medications.  We discussed her current depressive issues and she is following up as mentioned with psychiatry.  No medication adjustments at this time.    Risks of vaping/nicotine in pregnancy reviewed and discussed.    We had previously discussed and once again discussed the patient's issues with syncopal/vasovagal problems.  We will endeavor to have her seen by Cardiology as soon as possible.    The patient has an approximately 5 mm to 10 mm raised pinkish lesion on her back at the site of her left shoulder.  In addition there was a smaller similar lesion on her left thigh.  Skin changes in pregnancy were discussed and referral to dermatology has been placed.    We discussed her reported history of HSV and plans for prophylaxis beginning at 36 weeks    We discussed findings of LGSIL on her Pap smear and plans for repeat Pap smear following  delivery.    Influenza vaccine (October to May) & COVID vaccine recommended any time in pregnancy    See end of note for additional documentation.     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    INITIAL LABS:    MBT: A positive  AB SCREEN: negative  RPR: negative  RUBELLA: Immune  HEPATITIS A/B/C: negative  HIV: negative  CBC: DATE: 11/8/2023         7.5 >--- 11.5 / 32.4 ---< 215  HGB: normal  URINE CX: negative  Other:      10-12 WEEK LABS:    PAP: low-grade squamous intraepithelial neoplasia (LGSIL - encompassing HPV,mild dysplasia,RENA I) / Date: 7/10/2023    ALLA/CHLAM: negative x 2    cfDNA ([id number]):  Negative per patient    CARRIER SCREEN (Inheritest Core):  Patient reports previous testing for negative carrier status   28 WEEK LABS:         OTHER LABS:     OTHER ULTRASOUNDS:     TO-DO THROUGHOUT PREGNANCY:    Depression Screen (20wks): ___    Rhogam: ___  Influenza vaccine (OCT-MAY): ___  TDAP (27-36wks): ___    Birth Plan: ___  Plans to breastfeed: ___  Plans for epidural: ___  Classes at hospital: ___    Postpartum contraception: ___  Consent for delivery: ___  TOLAC counseling: ___  BTL counseling: ___   MEDICATIONS:    Prenatal vitamins  Promethazine 25 mg tablet    Lexapro 20 mg  Klonopin 0.5 mg PRN  Lamictal 25 mg  ALLERGIES:    Clindamycin: Shortness of breath and hives    MEDICAL HISTORY:    Depression, anxiety and PTSD.  Pre-pregnancy BMI = 19.3 SURGICAL HISTORY:    Negative    SOCIAL HISTORY:    Smoker:  Positive for vaping  Alcohol: denies  Drugs: denies  Relationship: co-habitating  Domestic Violence: no  Lives with:  FOB and four children  Education Level: Some College  Occupation: homemaker  Buddhist:  Hindu  Other:   GYN HISTORY:    PAP'S: no h/o abnormals  STI'S: no past history  GENITAL HSV:  The patient reports previous positive testing but no reported outbreaks.  She was placed on HSV prophylaxis with previous pregnancies FAMILY HISTORY:    HTN: Yes - mother  DIABETES: Yes - mother and sibling  BLEEDING D/O: No  CLOTTING D/O: No  BIRTH DEFECTS: No  MENTAL DISABILITY: No  TWINS OR MORE: No  GYN CANCER: No  Other:     OBSTETRIC HISTORY   Month/Year Mode of Delivery EGA Wt. M/F Complications / Comments   2015    Male Now    2018    Male    2019    Female                      Plan:      22 weeks gestation of pregnancy  -     C. trachomatis/N. gonorrhoeae by AMP DNA [facility]; Urine  -     aspirin (ECOTRIN) 81 MG EC tablet; Take 1 tablet (81 mg total) by mouth once daily.  Dispense: 90 tablet; Refill: 1    Syncope, unspecified syncope type  -     Ambulatory referral/consult to Cardiology; Future; Expected date: 2023    Skin lesions  -     Ambulatory referral/consult to Dermatology; Future; Expected date: 2023         No follow-ups on file.     Pilo Peng MD  Department OBGYN [facility]      FAMILY LINEAGE AND HISTORY:  Ashkenazi or North American Zoroastrianism:  No  Intellectual disability:  No  Premature ovarian failure (<40yoa):  No  Cajun or Kazakh Lipscomb:  No    MISCELLANEOUS:  Does the patient have cats? No        Date:      Dating  ======  Previous Ultrasound on: 2023  Type of prior assessment:  CRL  U/S measurement at prior assessment date 64.0 mm  GA by previous U/S 22 w + 3 d  ROMAN by previous Ultrasound: 3/16/2024  Ultrasound examination on: 11/14/2023  GA by U/S based upon: AC, BPD, Femur, HC  GA by U/S 21 w + 3 d  ROMAN by U/S: 3/23/2024  Assigned: based on ultrasound (CRL), selected on 11/14/2023  Assigned GA 22 w + 3 d  Assigned ROMAN: 3/16/2024    General Evaluation  ==============  Cardiac activity present.  bpm. Fetal movements: visualized. Presentation: breech  Placenta: Placental site: posterior. Placental edge-to-cervical os distance 42 mm. suboptimal for placental edge to cervix per transabdominal ultrasound.  Umbilical cord: Cord vessels: 3 vessel cord. Insertion site: normal insertion  Amniotic fluid: Amount of AF: normal amount per qualitative assessment    Fetal Biometry  ============  Standard  BPD 50.7 mm 21w 3d Hadlock  .6 mm 21w 2d Hadlock  Cerebellum tr 22.0 mm 21w 3d Moreno  .4 mm 21w 2d Hadlock  Femur 36.4 mm 21w 4d Hadlock  Humerus 35.9 mm 22w 4d Eric  HC / AC 1.17   g 20w 2d 13% Taiwo  EFW (lb) 0 lb  EFW (oz) 15 oz  EFW by: Hadlock (BPD-HC-AC-FL)  Extended  CM 4.0 mm  Nasal bone 5.6 mm  Extremities / Bony Struc  FL / BPD 0.72  FL / HC 0.19  FL / AC 0.23  Other Structures   bpm    Fetal Anatomy  ===========  Cranium: normal  Lateral ventricles: normal  Choroid plexus: normal  Midline falx: normal  Cavum septi pellucidi: normal  Cerebellum: normal  Cisterna magna: normal  Lips: normal  Profile: normal  Nose: normal  Face  Orbits: visualized  Lens: visualized  4-chamber view: normal  RVOT view: suboptimal  LVOT view: normal  3-vessel view: normal  3-vessel-trachea view: suboptimal  Heart / Thorax  Situs: situs solitus (normal)  Diaphragm: suboptimal  Cord insertion: suboptimal  Stomach: normal  Kidneys: normal  Bladder: normal  Genitals: normal  Abdomen  Abdom. wall: suboptimal  Bowel: normal  Cervical spine: normal  Thoracic spine:  normal  Lumbar spine: suboptimal  Sacral spine: suboptimal  Spine  Lumbar spine: sagittal bones appear wnl; sagittal skin line suboptimal  Sacral spine: sagittal bones appear wnl; sagittal skin line suboptimal  Spine: transverse views of spine suboptimal for skin line  Rt arm: normal  Lt arm: normal  Rt leg: normal  Lt leg: normal  Position of hands: normal  Position of feet: normal  Fetal sex: female  Wants to know fetal sex: yes    Maternal Structures  ===============  Uterus / Cervix  Uterus: Normal  Cervix: Visualized  Approach: Transabdominal  Cervical length 41.3 mm  Ovaries / Tubes / Adnexa  Rt ovary: Visualized  Lt ovary: Not visualized    Impression  =========  read only ultrasound:  1. 22w 3d calderon intrauterine pregnancy  2. Interval fetal growth wnl with EFW = 419 gms at 13% and AC 11%  3. No fetal structural abnormalities appreciated but incomplete fetal anatomical survey  4. Amniotic fluid volume wnl per qualitative assessment  5. Posterior placenta and placental edge to cervix appears wnl per transabdominal ultrasound but suboptimal visualization  6. Cervical length screen via transabdominal ultrasound wnl

## 2023-11-17 NOTE — TELEPHONE ENCOUNTER
----- Message from Pilo Peng MD sent at 11/17/2023  4:50 PM CST -----  Regarding: Note  Please provide this patient with a letter stating that due to her pregnancy and additional medical issues it is not recommended that she travel be on 2 hours from her home.  Contact the patient and ask her if this would be sufficient.    Officially I tried to keep these letters vague as it is really not anybody else is business regarding the patient's health as long as we notify them of our recommended restrictions.  We can further discuss on Monday.    Thanks.  SP

## 2023-11-17 NOTE — LETTER
November 17, 2023      [facility]  [address]  [address]  [place] [address]  Phone: [phone]  Fax: [phone]       Patient: Josefa Oliver   YOB: 1995      To Whom It May Concern:    Josefa Oliver is under our care at [facility]. Due to pregnancy and additional medical issues it is not recommended that she travel more than 2 hours from her home.  If you have any questions or concerns, or if I can be of further assistance, please do not hesitate to contact me.    Sincerely,      Pilo Peng MD/  Tricia Boyle RN

## 2023-11-20 ENCOUNTER — PATIENT MESSAGE (OUTPATIENT)
Dept: ADMINISTRATIVE | Facility: OTHER | Age: 28
End: 2023-11-20
Payer: MEDICAID

## 2023-11-21 ENCOUNTER — PATIENT MESSAGE (OUTPATIENT)
Dept: OBSTETRICS AND GYNECOLOGY | Facility: CLINIC | Age: 28
End: 2023-11-21
Payer: MEDICAID

## 2023-11-21 DIAGNOSIS — O99.342 DEPRESSION AFFECTING PREGNANCY IN SECOND TRIMESTER, ANTEPARTUM: Primary | ICD-10-CM

## 2023-11-21 DIAGNOSIS — F32.A DEPRESSION AFFECTING PREGNANCY IN SECOND TRIMESTER, ANTEPARTUM: Primary | ICD-10-CM

## 2023-11-21 RX ORDER — ESCITALOPRAM OXALATE 20 MG/1
20 TABLET ORAL DAILY
Qty: 90 TABLET | Refills: 3 | Status: SHIPPED | OUTPATIENT
Start: 2023-11-21 | End: 2024-11-21

## 2023-11-22 ENCOUNTER — OFFICE VISIT (OUTPATIENT)
Dept: CARDIOLOGY | Facility: CLINIC | Age: 28
End: 2023-11-22
Payer: MEDICAID

## 2023-11-22 VITALS
HEART RATE: 108 BPM | SYSTOLIC BLOOD PRESSURE: 107 MMHG | BODY MASS INDEX: 21.2 KG/M2 | WEIGHT: 112.19 LBS | DIASTOLIC BLOOD PRESSURE: 71 MMHG | OXYGEN SATURATION: 99 %

## 2023-11-22 DIAGNOSIS — O99.341 DEPRESSION DURING PREGNANCY IN FIRST TRIMESTER: ICD-10-CM

## 2023-11-22 DIAGNOSIS — F32.A DEPRESSION DURING PREGNANCY IN FIRST TRIMESTER: ICD-10-CM

## 2023-11-22 DIAGNOSIS — Z72.0 VAPES NICOTINE CONTAINING SUBSTANCE: ICD-10-CM

## 2023-11-22 DIAGNOSIS — D50.8 OTHER IRON DEFICIENCY ANEMIA: ICD-10-CM

## 2023-11-22 DIAGNOSIS — R55 SYNCOPE AND COLLAPSE: Primary | ICD-10-CM

## 2023-11-22 DIAGNOSIS — Z3A.22 22 WEEKS GESTATION OF PREGNANCY: ICD-10-CM

## 2023-11-22 DIAGNOSIS — E87.6 HYPOKALEMIA: ICD-10-CM

## 2023-11-22 DIAGNOSIS — R55 SYNCOPE, UNSPECIFIED SYNCOPE TYPE: ICD-10-CM

## 2023-11-22 PROCEDURE — 99204 OFFICE O/P NEW MOD 45 MIN: CPT | Mod: S$PBB,25,TH, | Performed by: GENERAL PRACTICE

## 2023-11-22 PROCEDURE — 1160F RVW MEDS BY RX/DR IN RCRD: CPT | Mod: CPTII,,, | Performed by: GENERAL PRACTICE

## 2023-11-22 PROCEDURE — 1159F MED LIST DOCD IN RCRD: CPT | Mod: CPTII,,, | Performed by: GENERAL PRACTICE

## 2023-11-22 PROCEDURE — 1159F PR MEDICATION LIST DOCUMENTED IN MEDICAL RECORD: ICD-10-PCS | Mod: CPTII,,, | Performed by: GENERAL PRACTICE

## 2023-11-22 PROCEDURE — 3078F PR MOST RECENT DIASTOLIC BLOOD PRESSURE < 80 MM HG: ICD-10-PCS | Mod: CPTII,,, | Performed by: GENERAL PRACTICE

## 2023-11-22 PROCEDURE — 3074F SYST BP LT 130 MM HG: CPT | Mod: CPTII,,, | Performed by: GENERAL PRACTICE

## 2023-11-22 PROCEDURE — 3078F DIAST BP <80 MM HG: CPT | Mod: CPTII,,, | Performed by: GENERAL PRACTICE

## 2023-11-22 PROCEDURE — 3008F BODY MASS INDEX DOCD: CPT | Mod: CPTII,,, | Performed by: GENERAL PRACTICE

## 2023-11-22 PROCEDURE — 3008F PR BODY MASS INDEX (BMI) DOCUMENTED: ICD-10-PCS | Mod: CPTII,,, | Performed by: GENERAL PRACTICE

## 2023-11-22 PROCEDURE — 3074F PR MOST RECENT SYSTOLIC BLOOD PRESSURE < 130 MM HG: ICD-10-PCS | Mod: CPTII,,, | Performed by: GENERAL PRACTICE

## 2023-11-22 PROCEDURE — 1160F PR REVIEW ALL MEDS BY PRESCRIBER/CLIN PHARMACIST DOCUMENTED: ICD-10-PCS | Mod: CPTII,,, | Performed by: GENERAL PRACTICE

## 2023-11-22 PROCEDURE — 99213 OFFICE O/P EST LOW 20 MIN: CPT | Mod: PBBFAC,PN | Performed by: GENERAL PRACTICE

## 2023-11-22 PROCEDURE — 99999 PR PBB SHADOW E&M-EST. PATIENT-LVL III: CPT | Mod: PBBFAC,,, | Performed by: GENERAL PRACTICE

## 2023-11-22 PROCEDURE — 93005 ELECTROCARDIOGRAM TRACING: CPT | Mod: PBBFAC,PN | Performed by: GENERAL PRACTICE

## 2023-11-22 PROCEDURE — 93010 EKG 12-LEAD: ICD-10-PCS | Mod: S$PBB,,, | Performed by: GENERAL PRACTICE

## 2023-11-22 PROCEDURE — 99999 PR PBB SHADOW E&M-EST. PATIENT-LVL III: ICD-10-PCS | Mod: PBBFAC,,, | Performed by: GENERAL PRACTICE

## 2023-11-22 PROCEDURE — 99204 PR OFFICE/OUTPT VISIT, NEW, LEVL IV, 45-59 MIN: ICD-10-PCS | Mod: S$PBB,25,TH, | Performed by: GENERAL PRACTICE

## 2023-11-22 PROCEDURE — 93010 ELECTROCARDIOGRAM REPORT: CPT | Mod: S$PBB,,, | Performed by: GENERAL PRACTICE

## 2023-11-22 NOTE — PROGRESS NOTES
Subjective:    Patient ID:  Josefa Oliver is a 28 y.o. female who presents for evaluation of   Chief Complaint   Patient presents with    Establish Care       HPI:  He is referred here today for evaluation of syncope.  He is had history of for blackouts.  She had 1 blackout on her last pregnancy 4 years ago.  Since that time she will get tunnel vision and dizziness when she is standing on occasion.  But has not passed out.  She is currently  4 para 323 weeks pregnancy with a estimated made it date of confinement 2023.  She had 2 episodes.  They occur while she is standing she usually gets a tunnel vision chest pressure shortness of breath and dizziness coming on b4 she passes out .she goes out for a couple of minutes and comes back.  She was evaluated in the emergency room 11 8 23 in [facility] 234 beds at the [facility] and diagnosed with vasovagal syncope.  Did have ongoing tunnel vision while waiting to see the doctor with normal vital signs.    EKG today reveals normal sinus rhythm normal EKG  Review of patient's allergies indicates:   Allergen Reactions    Clindamycin Other (See Comments) and Rash       Past Medical History:   Diagnosis Date    Abnormal Pap smear of cervix      History reviewed. No pertinent surgical history.  Social History     Tobacco Use    Smoking status: Some Days     Types: Vaping with nicotine    Smokeless tobacco: Never   Substance Use Topics    Alcohol use: Not Currently    Drug use: Never     History reviewed. No pertinent family history.     Review of Systems:   Constitution: Negative for diaphoresis and fever.   HEENT: Negative for nosebleeds.    Cardiovascular: Negative for chest pain       No dyspnea on exertion       No leg swelling        No palpitations  Respiratory: Negative for shortness of breath and wheezing.    Hematologic/Lymphatic: Negative for bleeding problem. Does not bruise/bleed easily.   Skin: Negative for color change and rash.   Musculoskeletal: Negative for falls  and myalgias.   Gastrointestinal: Negative for hematemesis and hematochezia.   Genitourinary: Negative for hematuria.   Neurological: Negative for dizziness and light-headedness.   Psychiatric/Behavioral: Negative for altered mental status and memory loss.          Objective:        Vitals:    11/22/23 1345   BP: 107/71   Pulse: 108       Lab Results   Component Value Date    WBC 7.50 11/08/2023    HGB 11.5 (L) 11/08/2023    HCT 32.4 (L) 11/08/2023     11/08/2023    CHOL 118 01/19/2023    TRIG 60 01/19/2023    HDL 44 01/19/2023    ALT 12 11/08/2023    AST 13 11/08/2023     (L) 11/08/2023    K 3.3 (L) 11/08/2023     11/08/2023    CREATININE 0.5 11/08/2023    BUN 5 (L) 11/08/2023    CO2 23 11/08/2023    TSH 1.398 01/14/2023    HGBA1C 5.0 08/24/2022        ECHOCARDIOGRAM RESULTS  No results found for this or any previous visit.        CURRENT/PREVIOUS VISIT EKG  No results found for this or any previous visit.  No valid procedures specified.   No results found for this or any previous visit.      Physical Exam:  CONSTITUTIONAL: No fever, no chills  HEENT: Normocephalic, atraumatic,pupils reactive to light                 NECK:  No JVD no carotid bruit  CVS: S1S2+, RRR, no murmurs,   LUNGS: Clear  ABDOMEN: Soft, NT, BS+  EXTREMITIES: No cyanosis, edema  : No dorado catheter  NEURO: AAO X 3  PSY: Normal affect      Medication List with Changes/Refills   Current Medications    ASPIRIN (ECOTRIN) 81 MG EC TABLET    Take 1 tablet (81 mg total) by mouth once daily.    CLONAZEPAM (KLONOPIN) 0.5 MG TABLET    Take 0.5 mg by mouth daily as needed.    ESCITALOPRAM OXALATE (LEXAPRO) 20 MG TABLET    Take 1 tablet (20 mg total) by mouth once daily.    GALCANEZUMAB-GNLM 120 MG/ML PNIJ    Inject 120 mg into the skin.    LAMOTRIGINE (LAMICTAL) 25 MG TABLET    Take 25 mg by mouth.    ONDANSETRON (ZOFRAN) 4 MG TABLET    Take 4 mg by mouth every 6 (six) hours as needed.    PRENATAL VIT/IRON FUM/FOLIC AC (PRENATAL 1+1  ORAL)    Prenatal   one daily po             Assessment:       1. Syncope and collapse    2. Depression during pregnancy in first trimester    3. 22 weeks gestation of pregnancy    4. Hypokalemia    5. Vapes nicotine containing substance    6. Other iron deficiency anemia         Plan:     Problem List Items Addressed This Visit    None  Visit Diagnoses       Syncope and collapse    -  Primary    Relevant Orders    IN OFFICE EKG 12-LEAD (to [place])    Echo Saline Bubble? No    Cardiac Monitor - 3-15 Day Adult (Cupid Only)    Basic Metabolic Panel    Basic Metabolic Panel    Magnesium    Depression during pregnancy in first trimester        Relevant Orders    Basic Metabolic Panel    Basic Metabolic Panel    Magnesium    22 weeks gestation of pregnancy        Relevant Orders    Basic Metabolic Panel    Basic Metabolic Panel    Magnesium    Hypokalemia        Relevant Orders    Basic Metabolic Panel    Basic Metabolic Panel    Magnesium    Vapes nicotine containing substance        Relevant Orders    Basic Metabolic Panel    Magnesium    Other iron deficiency anemia                She has multiple risk factors which would contribute to avoid vasovagal syncope including the 22 weeks pregnancy.  Hypotension, tachycardia, and anemia.  She does have some hypokalemia which is unexplained would like to recheck that.  Will check the blood pressure laid sitting standing.  Will check echocardiogram and monitor.  Discussed with her the importance of trying to change positions before she passes out the because this is not uncommon with pregnancy she is probably lb to positional syncope even in between pregnancies.  Recommend graduated support stockings  Follow up in about 2 months (around 1/22/2024).    The patients questions were answered, they verbalized understanding, and agreed with the treatment plan.     KHADAR GALDAMEZ MD  [facility]

## 2023-11-29 ENCOUNTER — ROUTINE PRENATAL (OUTPATIENT)
Dept: OBSTETRICS AND GYNECOLOGY | Facility: CLINIC | Age: 28
End: 2023-11-29
Payer: MEDICAID

## 2023-11-29 VITALS
WEIGHT: 112.44 LBS | DIASTOLIC BLOOD PRESSURE: 70 MMHG | BODY MASS INDEX: 21.24 KG/M2 | SYSTOLIC BLOOD PRESSURE: 108 MMHG

## 2023-11-29 DIAGNOSIS — Z3A.24 24 WEEKS GESTATION OF PREGNANCY: Primary | ICD-10-CM

## 2023-11-29 DIAGNOSIS — K21.9 GASTROESOPHAGEAL REFLUX DISEASE WITHOUT ESOPHAGITIS: ICD-10-CM

## 2023-11-29 DIAGNOSIS — R55 SYNCOPE, UNSPECIFIED SYNCOPE TYPE: ICD-10-CM

## 2023-11-29 PROCEDURE — 99213 OFFICE O/P EST LOW 20 MIN: CPT | Mod: TH,S$PBB,, | Performed by: OBSTETRICS & GYNECOLOGY

## 2023-11-29 PROCEDURE — 99999 PR PBB SHADOW E&M-EST. PATIENT-LVL II: CPT | Mod: PBBFAC,,, | Performed by: OBSTETRICS & GYNECOLOGY

## 2023-11-29 PROCEDURE — 99212 OFFICE O/P EST SF 10 MIN: CPT | Mod: PBBFAC,PO | Performed by: OBSTETRICS & GYNECOLOGY

## 2023-11-29 PROCEDURE — 99999 PR PBB SHADOW E&M-EST. PATIENT-LVL II: ICD-10-PCS | Mod: PBBFAC,,, | Performed by: OBSTETRICS & GYNECOLOGY

## 2023-11-29 PROCEDURE — 99213 PR OFFICE/OUTPT VISIT, EST, LEVL III, 20-29 MIN: ICD-10-PCS | Mod: TH,S$PBB,, | Performed by: OBSTETRICS & GYNECOLOGY

## 2023-11-29 RX ORDER — FAMOTIDINE 20 MG/1
20 TABLET, FILM COATED ORAL 2 TIMES DAILY
Qty: 180 TABLET | Refills: 3 | Status: SHIPPED | OUTPATIENT
Start: 2023-11-29 | End: 2024-11-28

## 2023-11-29 NOTE — PROGRESS NOTES
Prenatal Note   Chief Complaint:  Routine Prenatal Visit       Patient ID: Josefa Oliver is a  28 y.o. female.    Date: 2023    TODAY'S PROGRESS NOTE    S/ 28 y.o. y.o.  at 24-1/7 weeks who presents for routine prenatal evaluation.    She denies vaginal bleeding, signs of rupture of membranes or significant uterine contractions.    She reports normal FM..    In some left-sided cramping as well as tailbone pain which tends to predate the pregnancy.  It is worse with walking.  No rubin contractions.    She has been having some increased issues with reflux.  No recent depression issues.    She has been seen by Cardiology and Holter monitor testing is pending but no clear cardiac issues were noted in regards to the patient's syncopal episode.    O/  VITALS:  Weight:  112 lb  BP:  108/70    FH: 24 cm  DT'S: 150's bpm by doppler    A/P  Intrauterine pregnancy at 24-1/7 weeks  Reflux  Depression  Syncopal episodes    The above was reviewed discussed with the patient.    We discussed her issues with reflux and a prescription for for famotidine has been given.  The pros, cons, risks, benefits, alternatives and indications of the medication(s) prescribed, as well as appropriate use and potential side effects were discussed.  We discussed the fact that there are potential issues with nearly all medications in pregnancy.  These include but are not limited to birth defects, pregnancy loss, prematurity, infant death, or developmental disabilities.     Blood work for 1 hour glucose testing CBC has been ordered    Physiologic changes and back pain and pregnancy were discussed.    At this time the patient's depression is stable and she is under the course psychiatric care    Basic prenatal issues reviewed discussed (recommendations for Tdap and influenza vaccine discussed)    The patient's questions regarding the above were answered and she is in agreement with the current plan.   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    INITIAL LABS:    MBT: A positive  AB SCREEN: negative  RPR: negative  RUBELLA: Immune  HEPATITIS A/B/C: negative  HIV: negative  CBC: DATE: 11/8/2023         7.5 >--- 11.5 / 32.4 ---< 215  HGB: normal  URINE CX: negative  Other:      10-12 WEEK LABS:    PAP: low-grade squamous intraepithelial neoplasia (LGSIL - encompassing HPV,mild dysplasia,RENA I) / Date: 7/10/2023    ALLA/CHLAM: negative x 2    cfDNA ([id number]):  Negative per patient    CARRIER SCREEN (Inheritest Core):  Patient reports previous testing for negative carrier status   28 WEEK LABS:         OTHER LABS:     OTHER ULTRASOUNDS:     TO-DO THROUGHOUT PREGNANCY:    Depression Screen (20wks): ___    Rhogam: ___  Influenza vaccine (OCT-MAY): ___  TDAP (27-36wks): ___    Birth Plan: ___  Plans to breastfeed: ___  Plans for epidural: ___  Classes at hospital: ___    Postpartum contraception: ___  Consent for delivery: ___  TOLAC counseling: ___  BTL counseling: ___   MEDICATIONS:    Prenatal vitamins  Promethazine 25 mg tablet   Lexapro 20 mg  Klonopin 0.5 mg PRN  Lamictal 25 mg  ALLERGIES:    Clindamycin: Shortness of breath and hives    MEDICAL HISTORY:    Depression, anxiety and PTSD.  Pre-pregnancy BMI = 19.3  SURGICAL HISTORY:    Negative    SOCIAL HISTORY:    Smoker:  Positive for vaping  Alcohol: denies  Drugs: denies  Relationship: co-habitating  Domestic Violence: no  Lives with:  FOB and four children  Education Level: Some College  Occupation: homemaker  Church:  [employer]  Other:   GYN HISTORY:    PAP'S: no h/o abnormals  STI'S: no past history  GENITAL HSV:  The patient reports previous positive testing but no reported outbreaks.  She was placed on HSV prophylaxis with previous pregnancies FAMILY HISTORY:    HTN: Yes - mother  DIABETES: Yes - mother and sibling  BLEEDING D/O: No  CLOTTING D/O: No  BIRTH DEFECTS: No  MENTAL DISABILITY: No  TWINS OR MORE: No  GYN CANCER: No  Other:     OBSTETRIC HISTORY   Month/Year Mode of Delivery EGA Wt. M/F Complications / Comments   2015    Male Now    2018    Male    2019    Female                      Plan:      24 weeks gestation of pregnancy  -     OB Glucose Screen; Future; Expected date: 2023  -     CBC Auto Differential; Future; Expected date: 2023  -     FERRITIN; Future; Expected date: 2023    Syncope, unspecified syncope type    Gastroesophageal reflux disease without esophagitis  -     famotidine (PEPCID) 20 MG tablet; Take 1 tablet (20 mg total) by mouth 2 (two) times daily.  Dispense: 180 tablet; Refill: 3      Follow up in about 4 weeks (around 2023) for Routine OB F/U or as needed.     Pilo Peng MD  Department OBGYN  [facility]      FAMILY LINEAGE AND HISTORY:  Ashkenazi or North American Jainism:  No  Intellectual disability:  No  Premature ovarian failure (<40yoa):  No  Cajun or French Calvert:  No    MISCELLANEOUS:  Does the patient have cats? No

## 2023-12-05 ENCOUNTER — HOSPITAL ENCOUNTER (OUTPATIENT)
Facility: HOSPITAL | Age: 28
Discharge: HOME | End: 2023-12-06
Attending: EMERGENCY MEDICINE | Admitting: OBSTETRICS & GYNECOLOGY
Payer: MEDICAID

## 2023-12-05 DIAGNOSIS — T14.90XA TRAUMA: ICD-10-CM
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
## 2023-12-05 DIAGNOSIS — R40.20 LOC (LOSS OF CONSCIOUSNESS): ICD-10-CM

## 2023-12-05 PROBLEM — Z3A.25 25 WEEKS GESTATION OF PREGNANCY: Status: ACTIVE | Noted: 2023-12-05
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
## 2023-12-05 LAB
ALBUMIN SERPL BCP-MCNC: 3.4 G/DL (ref 3.5–5.2)
ALP SERPL-CCNC: 43 U/L (ref 55–135)
ALT SERPL W/O P-5'-P-CCNC: 12 U/L (ref 10–44)
ANION GAP SERPL CALC-SCNC: 5 MMOL/L (ref 8–16)
AST SERPL-CCNC: 12 U/L (ref 10–40)
BASOPHILS # BLD AUTO: 0.02 K/UL (ref 0–0.2)
BASOPHILS NFR BLD: 0.2 % (ref 0–1.9)
BILIRUB SERPL-MCNC: 0.3 MG/DL (ref 0.1–1)
BUN SERPL-MCNC: 8 MG/DL (ref 6–20)
CALCIUM SERPL-MCNC: 8.9 MG/DL (ref 8.7–10.5)
CHLORIDE SERPL-SCNC: 105 MMOL/L (ref 95–110)
CO2 SERPL-SCNC: 26 MMOL/L (ref 23–29)
CREAT SERPL-MCNC: 0.5 MG/DL (ref 0.5–1.4)
DIFFERENTIAL METHOD: ABNORMAL
EOSINOPHIL # BLD AUTO: 0 K/UL (ref 0–0.5)
EOSINOPHIL NFR BLD: 0.5 % (ref 0–8)
ERYTHROCYTE [DISTWIDTH] IN BLOOD BY AUTOMATED COUNT: 12.1 % (ref 11.5–14.5)
EST. GFR  (NO RACE VARIABLE): >60 ML/MIN/1.73 M^2
GLUCOSE SERPL-MCNC: 83 MG/DL (ref 70–110)
HCT VFR BLD AUTO: 32.5 % (ref 37–48.5)
HGB BLD-MCNC: 11 G/DL (ref 12–16)
IMM GRANULOCYTES # BLD AUTO: 0.07 K/UL (ref 0–0.04)
IMM GRANULOCYTES NFR BLD AUTO: 0.8 % (ref 0–0.5)
LYMPHOCYTES # BLD AUTO: 1.3 K/UL (ref 1–4.8)
LYMPHOCYTES NFR BLD: 16 % (ref 18–48)
MCH RBC QN AUTO: 32.4 PG (ref 27–31)
MCHC RBC AUTO-ENTMCNC: 33.8 G/DL (ref 32–36)
MCV RBC AUTO: 96 FL (ref 82–98)
MONOCYTES # BLD AUTO: 0.6 K/UL (ref 0.3–1)
MONOCYTES NFR BLD: 6.7 % (ref 4–15)
NEUTROPHILS # BLD AUTO: 6.4 K/UL (ref 1.8–7.7)
NEUTROPHILS NFR BLD: 75.8 % (ref 38–73)
NRBC BLD-RTO: 0 /100 WBC
PLATELET # BLD AUTO: 227 K/UL (ref 150–450)
PMV BLD AUTO: 8.9 FL (ref 9.2–12.9)
POTASSIUM SERPL-SCNC: 4.5 MMOL/L (ref 3.5–5.1)
PROT SERPL-MCNC: 6.4 G/DL (ref 6–8.4)
RBC # BLD AUTO: 3.4 M/UL (ref 4–5.4)
SODIUM SERPL-SCNC: 136 MMOL/L (ref 136–145)
WBC # BLD AUTO: 8.4 K/UL (ref 3.9–12.7)

## 2023-12-05 PROCEDURE — 25000003 PHARM REV CODE 250: Performed by: OBSTETRICS & GYNECOLOGY

## 2023-12-05 PROCEDURE — 99222 1ST HOSP IP/OBS MODERATE 55: CPT | Mod: ,,, | Performed by: OBSTETRICS & GYNECOLOGY

## 2023-12-05 PROCEDURE — G0378 HOSPITAL OBSERVATION PER HR: HCPCS

## 2023-12-05 PROCEDURE — 25000003 PHARM REV CODE 250: Performed by: STUDENT IN AN ORGANIZED HEALTH CARE EDUCATION/TRAINING PROGRAM

## 2023-12-05 PROCEDURE — 99222 PR INITIAL HOSPITAL CARE,LEVL II: ICD-10-PCS | Mod: ,,, | Performed by: OBSTETRICS & GYNECOLOGY

## 2023-12-05 PROCEDURE — 99285 EMERGENCY DEPT VISIT HI MDM: CPT | Mod: 25

## 2023-12-05 PROCEDURE — 63600175 PHARM REV CODE 636 W HCPCS: Performed by: OBSTETRICS & GYNECOLOGY

## 2023-12-05 PROCEDURE — 85025 COMPLETE CBC W/AUTO DIFF WBC: CPT | Performed by: STUDENT IN AN ORGANIZED HEALTH CARE EDUCATION/TRAINING PROGRAM

## 2023-12-05 PROCEDURE — 80053 COMPREHEN METABOLIC PANEL: CPT | Performed by: STUDENT IN AN ORGANIZED HEALTH CARE EDUCATION/TRAINING PROGRAM

## 2023-12-05 RX ORDER — PRENATAL WITH FERROUS FUM AND FOLIC ACID 3080; 920; 120; 400; 22; 1.84; 3; 20; 10; 1; 12; 200; 27; 25; 2 [IU]/1; [IU]/1; MG/1; [IU]/1; MG/1; MG/1; MG/1; MG/1; MG/1; MG/1; UG/1; MG/1; MG/1; MG/1; MG/1
1 TABLET ORAL DAILY
Status: DISCONTINUED | OUTPATIENT
Start: 2023-12-06 | End: 2023-12-06 | Stop reason: HOSPADM

## 2023-12-05 RX ORDER — DIPHENHYDRAMINE HYDROCHLORIDE 50 MG/ML
25 INJECTION INTRAMUSCULAR; INTRAVENOUS EVERY 4 HOURS PRN
Status: DISCONTINUED | OUTPATIENT
Start: 2023-12-05 | End: 2023-12-06 | Stop reason: HOSPADM

## 2023-12-05 RX ORDER — ACETAMINOPHEN 500 MG
1000 TABLET ORAL
Status: COMPLETED | OUTPATIENT
Start: 2023-12-05 | End: 2023-12-05

## 2023-12-05 RX ORDER — METHOCARBAMOL 500 MG/1
500 TABLET, FILM COATED ORAL 4 TIMES DAILY
Status: DISCONTINUED | OUTPATIENT
Start: 2023-12-05 | End: 2023-12-06 | Stop reason: HOSPADM

## 2023-12-05 RX ORDER — ACETAMINOPHEN 325 MG/1
650 TABLET ORAL EVERY 6 HOURS PRN
Status: DISCONTINUED | OUTPATIENT
Start: 2023-12-05 | End: 2023-12-06 | Stop reason: HOSPADM

## 2023-12-05 RX ORDER — ONDANSETRON 4 MG/1
8 TABLET, ORALLY DISINTEGRATING ORAL EVERY 8 HOURS PRN
Status: DISCONTINUED | OUTPATIENT
Start: 2023-12-05 | End: 2023-12-06 | Stop reason: HOSPADM

## 2023-12-05 RX ORDER — OXYCODONE HYDROCHLORIDE 5 MG/1
5 TABLET ORAL EVERY 4 HOURS PRN
Status: DISCONTINUED | OUTPATIENT
Start: 2023-12-05 | End: 2023-12-06 | Stop reason: HOSPADM

## 2023-12-05 RX ORDER — SODIUM CHLORIDE 0.9 % (FLUSH) 0.9 %
10 SYRINGE (ML) INJECTION
Status: DISCONTINUED | OUTPATIENT
Start: 2023-12-05 | End: 2023-12-06 | Stop reason: HOSPADM

## 2023-12-05 RX ORDER — LAMOTRIGINE 25 MG/1
50 TABLET ORAL DAILY
Status: DISCONTINUED | OUTPATIENT
Start: 2023-12-05 | End: 2023-12-06 | Stop reason: HOSPADM

## 2023-12-05 RX ORDER — SIMETHICONE 80 MG
1 TABLET,CHEWABLE ORAL EVERY 6 HOURS PRN
Status: DISCONTINUED | OUTPATIENT
Start: 2023-12-05 | End: 2023-12-06 | Stop reason: HOSPADM

## 2023-12-05 RX ORDER — CLONAZEPAM 0.5 MG/1
0.5 TABLET ORAL DAILY
Status: DISCONTINUED | OUTPATIENT
Start: 2023-12-05 | End: 2023-12-06 | Stop reason: HOSPADM

## 2023-12-05 RX ORDER — PROCHLORPERAZINE EDISYLATE 5 MG/ML
5 INJECTION INTRAMUSCULAR; INTRAVENOUS EVERY 6 HOURS PRN
Status: DISCONTINUED | OUTPATIENT
Start: 2023-12-05 | End: 2023-12-06 | Stop reason: HOSPADM

## 2023-12-05 RX ORDER — AMOXICILLIN 250 MG
1 CAPSULE ORAL NIGHTLY PRN
Status: DISCONTINUED | OUTPATIENT
Start: 2023-12-05 | End: 2023-12-06 | Stop reason: HOSPADM

## 2023-12-05 RX ORDER — DIPHENHYDRAMINE HCL 25 MG
25 CAPSULE ORAL EVERY 4 HOURS PRN
Status: DISCONTINUED | OUTPATIENT
Start: 2023-12-05 | End: 2023-12-06 | Stop reason: HOSPADM

## 2023-12-05 RX ORDER — DEXTROSE, SODIUM CHLORIDE, SODIUM LACTATE, POTASSIUM CHLORIDE, AND CALCIUM CHLORIDE 5; .6; .31; .03; .02 G/100ML; G/100ML; G/100ML; G/100ML; G/100ML
INJECTION, SOLUTION INTRAVENOUS CONTINUOUS
Status: DISCONTINUED | OUTPATIENT
Start: 2023-12-05 | End: 2023-12-06 | Stop reason: HOSPADM

## 2023-12-05 RX ORDER — ESCITALOPRAM OXALATE 10 MG/1
20 TABLET ORAL DAILY
Status: DISCONTINUED | OUTPATIENT
Start: 2023-12-05 | End: 2023-12-06 | Stop reason: HOSPADM

## 2023-12-05 RX ADMIN — DEXTROSE, SODIUM CHLORIDE, SODIUM LACTATE, POTASSIUM CHLORIDE, AND CALCIUM CHLORIDE: 5; .6; .31; .03; .02 INJECTION, SOLUTION INTRAVENOUS at 06:12

## 2023-12-05 RX ADMIN — LAMOTRIGINE 50 MG: 25 TABLET ORAL at 10:12

## 2023-12-05 RX ADMIN — ACETAMINOPHEN 1000 MG: 500 TABLET ORAL at 04:12

## 2023-12-05 RX ADMIN — ACETAMINOPHEN 650 MG: 325 TABLET ORAL at 07:12

## 2023-12-05 RX ADMIN — ESCITALOPRAM OXALATE 20 MG: 10 TABLET ORAL at 10:12

## 2023-12-05 RX ADMIN — CLONAZEPAM 0.5 MG: 0.5 TABLET ORAL at 10:12

## 2023-12-05 NOTE — ED PROVIDER NOTES
Encounter Date: 12/5/2023       History     Chief Complaint   Patient presents with    Fall     Slipped and fell down stairs, states she lost consciousness, c/o lower back pain and chest pain     HPI  Josefa Oliver is a 28 year female who is roughly 25 weeks pregnant based on last OBGYN note, 11/29, in which she was 24 weeks and presents here following a trip and fall down the steps in which he lost consciousness and complains of significant neck pain as well as lower back pain.  Her neck pain is in the midline in her back pain is spread across her entire back.  She is no significant abdominal pain but does state that her abdomen feels tight.  No vaginal bleeding, chest pain, shortness of breath, headache/blurry vision, or any other complaints.  Review of patient's allergies indicates:   Allergen Reactions    Clindamycin Other (See Comments) and Rash     Past Medical History:   Diagnosis Date    Abnormal Pap smear of cervix      No past surgical history on file.  No family history on file.  Social History     Tobacco Use    Smoking status: Some Days     Types: Vaping with nicotine    Smokeless tobacco: Never   Substance Use Topics    Alcohol use: Not Currently    Drug use: Never     Review of Systems   All other systems reviewed and are negative.      Physical Exam     Initial Vitals [12/05/23 1623]   BP Pulse Resp Temp SpO2   101/69 95 18 98.2 °F (36.8 °C) 97 %      MAP       --         Physical Exam    Nursing note and vitals reviewed.  Constitutional: She appears well-developed and well-nourished.   HENT:   Head: Normocephalic and atraumatic.   Right Ear: External ear normal.   Left Ear: External ear normal.   Mouth/Throat: Oropharynx is clear and moist. No oropharyngeal exudate.   Eyes: Conjunctivae are normal. Pupils are equal, round, and reactive to light.   Neck:   C-collar in place, midline C-spine tenderness to palpation   Cardiovascular:  Normal rate and regular rhythm.           Pulmonary/Chest:  Breath sounds normal. No respiratory distress. She has no wheezes.   Abdominal: Abdomen is soft. There is no abdominal tenderness.   Gravid abdomen   Musculoskeletal:         General: No edema. Normal range of motion.      Comments: Bilateral lower back paraspinal tenderness to palpation as well as midline T and L-spine tenderness to palpation without any obvious deformities     Neurological: She is alert.   Skin: Skin is warm and dry.   Psychiatric: She has a normal mood and affect.         ED Course   Procedures  Labs Reviewed   CBC W/ AUTO DIFFERENTIAL   COMPREHENSIVE METABOLIC PANEL          Imaging Results    None          Medications   acetaminophen tablet 1,000 mg (has no administration in time range)     Medical Decision Making  Differential includes but is not limited to intracranial hemorrhage, concussion, C/T/L-spine fracture, musculoskeletal pain.  Low suspicion for placental abruption/uterine rupture in the setting of normal vital signs and minimal abdominal pain.  Have discussed patient with her OBGYN who will come and evaluate the patient and recommends obtaining a pelvic ultrasound to determine any fetal distress at this time.  We will also obtain CT head and CT C-spine as well as T and L-spine plain films.    Amount and/or Complexity of Data Reviewed  Labs: ordered.  Radiology: ordered.    Risk  OTC drugs.  Decision regarding hospitalization.  Diagnosis or treatment significantly limited by social determinants of health.               ED Course as of 12/05/23 2151 Tue Dec 05, 2023   1723 No elevated white blood cell count concerning for infection or acute stress response, mild anemia with hemoglobin 11 but in the setting of no acute bleeding or signs of hemodynamic compromise do not believe this is an acute emergency. [REYNALDO]   1731 After discussion with OBGYN, they will admit patient for observation following CT and x-ray imaging.  On my independent interpretation, CT head shows no acute  intracranial process and CT C-spine shows no acute cervical spine fracture or dislocation. [REYNALDO]   2149 Normal  T/L-spine x-rays.  Patient was admitted to OBGYN for continued monitoring. [REYNALDO]      ED Course User Index  [REYNALDO] Edu Locke MD                             Clinical Impression:  Final diagnoses:  [T14.90XA] Trauma                 Edu Locke MD  Resident  12/05/23 2150       Edu Locke MD  Resident  12/05/23 2151

## 2023-12-05 NOTE — PHARMACY MED REC
"Admission Medication History     The home medication history was taken by Vince Stevenson.    You may go to "Admission" then "Reconcile Home Medications" tabs to review and/or act upon these items.     The home medication list has been updated by the Pharmacy department.   Please read ALL comments highlighted in yellow.   Please address this information as you see fit.    Feel free to contact us if you have any questions or require assistance.      The medications listed below were removed from the home medication list. Please reorder if appropriate:  Patient reports no longer taking the following medication(s):  Ondansetron 4 mg    Medications listed below were obtained from: Patient/family and Analytic software- [employer]  No current facility-administered medications on file prior to encounter.     Current Outpatient Medications on File Prior to Encounter   Medication Sig Dispense Refill    aspirin (ECOTRIN) 81 MG EC tablet Take 1 tablet (81 mg total) by mouth once daily. 90 tablet 1    clonazePAM (KLONOPIN) 0.5 MG tablet Take 0.5 mg by mouth daily as needed for Anxiety.      EScitalopram oxalate (LEXAPRO) 20 MG tablet Take 1 tablet (20 mg total) by mouth once daily. (Patient taking differently: Take 20 mg by mouth every evening.) 90 tablet 3    famotidine (PEPCID) 20 MG tablet Take 1 tablet (20 mg total) by mouth 2 (two) times daily. 180 tablet 3    lamoTRIgine (LAMICTAL) 25 MG tablet Take 50 mg by mouth every evening.      prenatal vit/iron fum/folic ac (PRENATAL 1+1 ORAL) Take 1 tablet by mouth every evening.      galcanezumab-gnlm 120 mg/mL PnIj Inject 120 mg into the skin every 30 days.      [DISCONTINUED] ondansetron (ZOFRAN) 4 MG tablet Take 4 mg by mouth every 6 (six) hours as needed for Nausea.         Vince Stevenson  EXT 1924                .          "

## 2023-12-05 NOTE — H&P
[facility] - Emergency Dept  Department of Obstetrics & Gynecology  OB History & Physical      PATIENT NAME: Josefa Oliver    MRN: [id number]  TODAY'S DATE: 2023  ADMIT DATE: 2023    Reason for Admission: Fall    History of Present Illness:      Josefa Oliver is a 28 y.o.   who presents 2023 at 25w0d. Estimated Date of Delivery: 3/19/24.     The patient was seen and evaluated in the [facility] main emergency room.  At approximately 2:00 p.m. the patient reports slipping and falling down her stairs.  This was initially on her back and buttocks but at some point there was a head and neck trauma and the patient reports blacking out.    Positive fetal movement.  No vaginal bleeding.  Generalized back and abdominal discomfort    This pregnancy has been complicated by syncopal episodes.  The patient was seen evaluated by Cardiology and Holter monitor is currently in place.  The patient states that this was a slip and fall and she did not initially lose consciousness.  That occurred following potential head trauma.    See below for general prenatal information    Review of patient's allergies indicates:   Allergen Reactions    Clindamycin Other (See Comments) and Rash       Patient Active Problem List   Diagnosis    25 weeks gestation of pregnancy    Fall (on) (from) other stairs and steps, initial encounter       OB History    Para Term  AB Living   4 3 3     2   SAB IAB Ectopic Multiple Live Births           3      # Outcome Date GA Lbr Laurent/2nd Weight Sex Delivery Anes PTL Lv   4 Current            3 Term 19    F Vag-Spont   NAREDNRA   2 Term 18    M Vag-Spont   NARENDRA   1 Term 11/16/15    M Vag-Spont   DEC      Obstetric Comments   Vaginal delivery x 3       Past Medical History:   Diagnosis Date    Abnormal Pap smear of cervix     Fall (on) (from) other stairs and steps, initial encounter 2023       No past surgical history on file.    No  current facility-administered medications on file prior to encounter.     Current Outpatient Medications on File Prior to Encounter   Medication Sig Dispense Refill    aspirin (ECOTRIN) 81 MG EC tablet Take 1 tablet (81 mg total) by mouth once daily. 90 tablet 1    clonazePAM (KLONOPIN) 0.5 MG tablet Take 0.5 mg by mouth daily as needed.      EScitalopram oxalate (LEXAPRO) 20 MG tablet Take 1 tablet (20 mg total) by mouth once daily. 90 tablet 3    famotidine (PEPCID) 20 MG tablet Take 1 tablet (20 mg total) by mouth 2 (two) times daily. 180 tablet 3    galcanezumab-gnlm 120 mg/mL PnIj Inject 120 mg into the skin.      lamoTRIgine (LAMICTAL) 25 MG tablet Take 25 mg by mouth.      ondansetron (ZOFRAN) 4 MG tablet Take 4 mg by mouth every 6 (six) hours as needed.      prenatal vit/iron fum/folic ac (PRENATAL 1+1 ORAL) Prenatal   one daily po         No family history on file.    Review of Systems:  Review of Systems   Constitutional:  Negative for fever.   Eyes:  Negative for blurred vision.   Respiratory:  Negative for cough and shortness of breath.    Cardiovascular:  Negative for chest pain and palpitations.   Gastrointestinal: Negative.    Musculoskeletal:         Generalized body aches secondary to recent trauma   Neurological:  Negative for dizziness and headaches.     Physical Exam:  Physical Exam  Constitutional:       General: She is in acute distress (Stable but clearly uncomfortable secondary to recent trauma).      Comments: Patient currently supine in emergency room with neck brace on.  Clearly uncomfortable secondary to recent trauma   HENT:      Head: Normocephalic.   Cardiovascular:      Rate and Rhythm: Normal rate and regular rhythm.   Pulmonary:      Effort: Pulmonary effort is normal.      Breath sounds: Normal breath sounds.   Abdominal:      Comments: Abdomen is gravid with appropriate fundal height.  No rebound or guarding noted.   Neurological:      Mental Status: She is alert.      Comments:  Deferred to ED   Skin:     General: Skin is warm and dry.        CT scan of the head noted no acute issues  Bedside ultrasound performed.  Official reading pending but on discussion with ultrasound technologist a single intrauterine pregnancy with a appropriate fetal movement is noted.  No abnormalities of the placenta were noted.  A anterior uterine contraction is visualized.  The cervix appears closed with normal amniotic fluid.    Assessment:  28 y.o.,   at 25w0d Gestation     1. Trauma secondary to fall       Plan:  The patient has been reviewed and discussed with the ER physician.  CT scan of the head has been obtained and further x-rays of the spine have been ordered.    The above was reviewed discussed with the patient and FOB.    At this time no fetal issues are noted and the patient appears clinically stable with the exception of recent trauma.  Given the significance of the fall will plan to observe overnight for signs of potential abruption.  This was discussed with patient and FOB.  Tylenol and oxycodone for pain  IV hydration  Robaxin as a muscle relaxant  Continuous fetal monitoring and monitoring for signs of abruption  We will base further evaluation and treatment on the patient's fetal status over time.    The patient and significant other's questions regarding the above were answered and they are in agreement with the current plan.    Pilo Peng MD FACOG  Date of Service: 2023                        OB PROBLEM LIST:  Depression:  Currently under care of Psychiatry ([phone]) and on Lexapro 20 mg, Klonopin 0.5 mg PRN & Lamictal 25 mg (increased at this time secondary to pregnancy and depression over loss of 1st child.)  Syncopal episode: seen by Cardiology  History of HSV: Will initiate prophylaxis at 36 weeks  LGSIL on Pap smear      FINAL EDC:     3/19/2024 by US done 2023 @ 12-2/7 weeks        SO Name: Favian Cosby ANATOMY SCAN:     SIZE = DATES (EFW = 419 gms at 13%  and AC 11%)  ANATOMY: No fetal structural abnormalities appreciated but incomplete fetal anatomical survey  PLACENTA: Posterior placenta and placental edge to cervix appears wnl per transabdominal ultrasound but suboptimal visualization  OTHER: Cervical length screen via transabdominal ultrasound wnl.    INITIAL LABS:     MBT: A positive  AB SCREEN: negative  RPR: negative  RUBELLA: Immune  HEPATITIS A/B/C: negative  HIV: negative  CBC: DATE: 11/8/2023         7.5 >--- 11.5 / 32.4 ---< 215  HGB: normal  URINE CX: negative  Other:        10-12 WEEK LABS:     PAP: low-grade squamous intraepithelial neoplasia (LGSIL - encompassing HPV,mild dysplasia,RENA I) / Date: 7/10/2023     ALLA/CHLAM: negative x 2     cfDNA ([id number]):  Negative per patient     CARRIER SCREEN (Inheritest Core):  Patient reports previous testing for negative carrier status    28 WEEK LABS:            OTHER LABS:       OTHER ULTRASOUNDS:       TO-DO THROUGHOUT PREGNANCY:     Depression Screen (20wks):  Known history of depression currently being treated      MEDICATIONS:     Prenatal vitamins  Promethazine 25 mg tablet   Lexapro 20 mg  Klonopin 0.5 mg PRN  Lamictal 25 mg  ALLERGIES:     Clindamycin: Shortness of breath and hives    MEDICAL HISTORY:     Depression, anxiety and PTSD.  Pre-pregnancy BMI = 19.3 SURGICAL HISTORY:     Negative    SOCIAL HISTORY:     Smoker:  Positive for vaping  Alcohol: denies  Drugs: denies  Relationship: co-habitating  Domestic Violence: no  Lives with:  FOB and four children  Education Level: Some College  Occupation: homemaker  Synagogue:  [employer] day  Other:    GYN HISTORY:     PAP'S: no h/o abnormals  STI'S: no past history  GENITAL HSV:  The patient reports previous positive testing but no reported outbreaks.  She was placed on HSV prophylaxis with previous pregnancies FAMILY HISTORY:     HTN: Yes - mother  DIABETES: Yes - mother and sibling  BLEEDING D/O: No  CLOTTING D/O: No  BIRTH DEFECTS: No  MENTAL DISABILITY:  No  TWINS OR MORE: No  GYN CANCER: No  Other:      OBSTETRIC HISTORY   Month/Year Mode of Delivery EGA Wt. M/F Complications / Comments   2015      Male Now    2018      Male     2019      Female                                    Pilo Peng MD  [facility]

## 2023-12-06 ENCOUNTER — PATIENT MESSAGE (OUTPATIENT)
Dept: OBSTETRICS AND GYNECOLOGY | Facility: CLINIC | Age: 28
End: 2023-12-06
Payer: MEDICAID

## 2023-12-06 VITALS
HEIGHT: 61 IN | OXYGEN SATURATION: 100 % | HEART RATE: 78 BPM | BODY MASS INDEX: 21.14 KG/M2 | SYSTOLIC BLOOD PRESSURE: 98 MMHG | DIASTOLIC BLOOD PRESSURE: 56 MMHG | RESPIRATION RATE: 18 BRPM | TEMPERATURE: 98 F | WEIGHT: 112 LBS

## 2023-12-06 PROCEDURE — 25000003 PHARM REV CODE 250: Performed by: OBSTETRICS & GYNECOLOGY

## 2023-12-06 PROCEDURE — 99231 PR SUBSEQUENT HOSPITAL CARE,LEVL I: ICD-10-PCS | Mod: ,,, | Performed by: OBSTETRICS & GYNECOLOGY

## 2023-12-06 PROCEDURE — 99231 SBSQ HOSP IP/OBS SF/LOW 25: CPT | Mod: ,,, | Performed by: OBSTETRICS & GYNECOLOGY

## 2023-12-06 PROCEDURE — G0378 HOSPITAL OBSERVATION PER HR: HCPCS

## 2023-12-06 RX ORDER — NAPROXEN SODIUM 220 MG/1
81 TABLET, FILM COATED ORAL DAILY
Status: DISCONTINUED | OUTPATIENT
Start: 2023-12-06 | End: 2023-12-06 | Stop reason: HOSPADM

## 2023-12-06 RX ORDER — FAMOTIDINE 20 MG/1
20 TABLET, FILM COATED ORAL 2 TIMES DAILY
Status: DISCONTINUED | OUTPATIENT
Start: 2023-12-06 | End: 2023-12-06 | Stop reason: HOSPADM

## 2023-12-06 RX ORDER — METHOCARBAMOL 500 MG/1
500 TABLET, FILM COATED ORAL 4 TIMES DAILY
Qty: 20 TABLET | Refills: 0 | Status: SHIPPED | OUTPATIENT
Start: 2023-12-06 | End: 2023-12-11

## 2023-12-06 RX ADMIN — LAMOTRIGINE 50 MG: 25 TABLET ORAL at 09:12

## 2023-12-06 RX ADMIN — FAMOTIDINE 20 MG: 20 TABLET ORAL at 08:12

## 2023-12-06 RX ADMIN — ASPIRIN 81 MG 81 MG: 81 TABLET ORAL at 08:12

## 2023-12-06 RX ADMIN — ONDANSETRON 8 MG: 4 TABLET, ORALLY DISINTEGRATING ORAL at 05:12

## 2023-12-06 RX ADMIN — OXYCODONE HYDROCHLORIDE 5 MG: 5 TABLET ORAL at 05:12

## 2023-12-06 RX ADMIN — ESCITALOPRAM OXALATE 20 MG: 10 TABLET ORAL at 09:12

## 2023-12-06 RX ADMIN — METHOCARBAMOL 500 MG: 500 TABLET ORAL at 09:12

## 2023-12-06 NOTE — PLAN OF CARE
12/06/23 1156   Final Note   Assessment Type Final Discharge Note   Anticipated Discharge Disposition Home   What phone number can be called within the next 1-3 days to see how you are doing after discharge? [phone]   Post-Acute Status   Discharge Delays None known at this time     Discharge orders and chart reviewed with no further post-acute discharge needs identified at this time.  At this time, patient is cleared for discharge from Case Management standpoint.

## 2023-12-06 NOTE — PLAN OF CARE
[facility]  Initial Discharge Assessment       Primary Care Provider: Ted Hess NP    Admission Diagnosis: Fall, initial encounter [W19.XXXA]    Admission Date: 12/5/2023  Expected Discharge Date:     Pt is a 28-year-old female/male who arrived from home with Fall (on) (from) other stairs and steps, initial encounter. Information verified as correct on facesheet. The assessment was completed at the patient's bedside.  Pt lives with dependent child. Pt does not have a Living Will or Advance Directive. The Pt is oriented to person, places, and times. PCP last seen.  Pt denies Coumadin, dialysis, DME, HH, and has not been readmitted into the hospital in the last thirty days.  Pt is capable of performing ADLs without assistance. Pt drivers to and from medical appointments. Pt reports she takes medication as prescribed; pharmacy used [facility].  Pt verbalized plan to discharge home via family transport. Pt has no other needs to be addressed at this time. CM reviewed the chart and will continue to monitor.       Transition of Care Barriers: None    Payor: [place] / Plan: [place] MEDICA[place] [employer] [employer] / Product Type: Managed Medicaid /     Extended Emergency Contact Information  Primary Emergency Contact: Tonia Roman  Mobile Phone: [phone]  Relation: Friend  Preferred language: English   needed? No    Discharge Plan A: Home with family  Discharge Plan B: Home      [id number] - [place], [place] - [address] AT [facility] [address]  [address]  [place] [address]  Phone: [phone] Fax: [phone]      Initial Assessment (most recent)       Adult Discharge Assessment - 12/06/23 1053          Discharge Assessment    Assessment Type Discharge Planning Assessment     Confirmed/corrected address, phone number and insurance Yes     Confirmed Demographics Correct on Facesheet     Source of Information patient     When was your  last doctors appointment? --   Three months ago    Does patient/caregiver understand observation status Yes     Communicated ROMAN with patient/caregiver Date not available/Unable to determine     Reason For Admission Fall (on) (from) other stairs and steps, initial encounter     People in Home child(nabila), dependent     Facility Arrived From: home     Do you expect to return to your current living situation? Yes     Do you have help at home or someone to help you manage your care at home? No     Prior to hospitilization cognitive status: Alert/Oriented     Current cognitive status: Alert/Oriented     Home Accessibility not wheelchair accessible     Equipment Currently Used at Home none     Readmission within 30 days? No     Patient currently being followed by outpatient case management? No     Do you currently have service(s) that help you manage your care at home? No     Do you take prescription medications? Yes     Do you have prescription coverage? Yes     Coverage Payor: [place] MEDICA[place] [facility] -     Do you have any problems affording any of your prescribed medications? No     Is the patient taking medications as prescribed? yes     Who is going to help you get home at discharge? Tonia Roman/friend [phone]     How do you get to doctors appointments? car, drives self     Are you on dialysis? No     Do you take coumadin? No     DME Needed Upon Discharge  none     Discharge Plan discussed with: Patient     Transition of Care Barriers None     Discharge Plan A Home with family     Discharge Plan B Home

## 2023-12-06 NOTE — DISCHARGE SUMMARY
[facility]  Department of Obstetrics & Gynecology  Antepartum Discharge Summary      PATIENT NAME: Josefa Oliver    MRN: [id number]  TODAY'S DATE: 2023  ADMIT DATE: 2023    Discharge Date: 2023    Attending Physician: Pilo Peng M.D., FACOG     Reason for Admission:  25 week pregnancy with fall down stairs    Chief Complaint:  Fall (Slipped and fell down stairs, states she lost consciousness, c/o lower back pain and chest pain)       Patient ID: Josefa Oliver is a  28 y.o. female.  G 4  at 25-/7 weeks     Hospital Course:    The patient was admitted after presenting to the emergency room following a fall.  She was seen and cleared by the ED.  Spinal x-rays and CT scan of the head noted no significant abnormalities.  Pelvic ultrasound noted no fetal or placental abnormalities  In light of the fall the patient was observed with plans for observation to continue until 24 hours after the fall, approximately 2:00 p.m. today.    The patient currently reports generalized aches but otherwise doing well.  Good pain control on oral medications.    Ambulating without difficulty.   Tolerating regular diet.      Vaginal bleeding:  Negative    Significant contractions: Negative    Rupture of membranes: Negative     Vital signs reviewed.  Last blood pressure:  100/60    Heart: Regular rate and rhythm  Lungs:  No abnormal pulmonary effort.  Clear to auscultation.  No pulmonary or cardiac issues noted.    Abdomen is gravid with appropriate FH.  No significant tenderness.      Fetal Status:  Fetal heart tones have been reassuring for 25 week gestation with no increased uterine activity/ contractions noted    Discharge Diet:  Regular    Discharge Activity:  As tolerated    Discharge Medications:      Medication List        START taking these medications      methocarbamoL 500 MG Tab  Commonly known as: ROBAXIN  Take 1 tablet (500 mg total) by mouth 4 (four) times daily. for 5 days      oxyCODONE 5 mg Tbor  Take 5 mg by mouth 4 (four) times a week.  Start taking on: December 7, 2023            CHANGE how you take these medications      EScitalopram oxalate 20 MG tablet  Commonly known as: LEXAPRO  Take 1 tablet (20 mg total) by mouth once daily.  What changed: when to take this            CONTINUE taking these medications      aspirin 81 MG EC tablet  Commonly known as: ECOTRIN  Take 1 tablet (81 mg total) by mouth once daily.     clonazePAM 0.5 MG tablet  Commonly known as: KlonoPIN     famotidine 20 MG tablet  Commonly known as: PEPCID  Take 1 tablet (20 mg total) by mouth 2 (two) times daily.     galcanezumab-gnlm 120 mg/mL Pnij     lamoTRIgine 25 MG tablet  Commonly known as: LAMICTAL     PRENATAL 1+1 ORAL               Where to Get Your Medications        These medications were sent to [id number] - [place], [place] - [address] AT [facility] OF [facility] [address]  [address], [place] [address]      Phone: [phone]   methocarbamoL 500 MG Tab  oxyCODONE 5 mg Tbor          Discharge Follow-Up:  As scheduled or as needed with Dr Peng    Condition on Discharge:  stable    Discharge Diagnosis:  Twenty-five week gestation status post fall.  The patient has been monitored with no signs of abruption.    Signs and symptoms of abruption or other potential fall issues reviewed discussed with the patient.  Plan for discharge to home after 24 hours post trauma observation.    The patient's questions regarding discharge were answered and she is in agreement with the discharge plan.    Pilo Peng M.D., FACOG

## 2023-12-12 ENCOUNTER — PATIENT MESSAGE (OUTPATIENT)
Dept: OTHER | Facility: OTHER | Age: 28
End: 2023-12-12
Payer: MEDICAID

## 2023-12-13 ENCOUNTER — HOSPITAL ENCOUNTER (OUTPATIENT)
Dept: CARDIOLOGY | Facility: HOSPITAL | Age: 28
Discharge: HOME | End: 2023-12-13
Attending: GENERAL PRACTICE
Payer: MEDICAID

## 2023-12-13 VITALS — WEIGHT: 112 LBS | HEIGHT: 61 IN | BODY MASS INDEX: 21.14 KG/M2

## 2023-12-13 DIAGNOSIS — R55 SYNCOPE AND COLLAPSE: ICD-10-CM

## 2023-12-13 PROCEDURE — 93306 TTE W/DOPPLER COMPLETE: CPT

## 2023-12-13 PROCEDURE — 93306 TTE W/DOPPLER COMPLETE: CPT | Mod: 26,,, | Performed by: GENERAL PRACTICE

## 2023-12-13 PROCEDURE — 93306 ECHO (CUPID ONLY): ICD-10-PCS | Mod: 26,,, | Performed by: GENERAL PRACTICE

## 2023-12-14 LAB
AORTIC ROOT ANNULUS: 2.6 CM
AORTIC VALVE CUSP SEPERATION: 1.7 CM
AV INDEX (PROSTH): 0.9
AV MEAN GRADIENT: 4 MMHG
AV PEAK GRADIENT: 7 MMHG
AV VALVE AREA BY VELOCITY RATIO: 2.66 CM²
AV VALVE AREA: 2.83 CM²
AV VELOCITY RATIO: 0.85
BSA FOR ECHO PROCEDURE: 1.48 M2
CV ECHO LV RWT: 0.56 CM
DOP CALC AO PEAK VEL: 1.32 M/S
DOP CALC AO VTI: 16.3 CM
DOP CALC LVOT AREA: 3.1 CM2
DOP CALC LVOT DIAMETER: 2 CM
DOP CALC LVOT PEAK VEL: 1.12 M/S
DOP CALC LVOT STROKE VOLUME: 46.16 CM3
DOP CALCLVOT PEAK VEL VTI: 14.7 CM
E WAVE DECELERATION TIME: 106 MSEC
E/A RATIO: 0.75
E/E' RATIO: 5.69 M/S
ECHO LV POSTERIOR WALL: 0.99 CM (ref 0.6–1.1)
FRACTIONAL SHORTENING: 35 % (ref 28–44)
INTERVENTRICULAR SEPTUM: 0.86 CM (ref 0.6–1.1)
IVRT: 134 MSEC
LEFT INTERNAL DIMENSION IN SYSTOLE: 2.33 CM (ref 2.1–4)
LEFT VENTRICLE DIASTOLIC VOLUME INDEX: 35.81 ML/M2
LEFT VENTRICLE DIASTOLIC VOLUME: 53 ML
LEFT VENTRICLE MASS INDEX: 64 G/M2
LEFT VENTRICLE SYSTOLIC VOLUME INDEX: 12.6 ML/M2
LEFT VENTRICLE SYSTOLIC VOLUME: 18.7 ML
LEFT VENTRICULAR INTERNAL DIMENSION IN DIASTOLE: 3.56 CM (ref 3.5–6)
LEFT VENTRICULAR MASS: 94.8 G
LV LATERAL E/E' RATIO: 3.89 M/S
LV SEPTAL E/E' RATIO: 10.57 M/S
LVOT MG: 3 MMHG
LVOT MV: 0.78 CM/S
MV PEAK A VEL: 0.99 M/S
MV PEAK E VEL: 0.74 M/S
MV STENOSIS PRESSURE HALF TIME: 44 MS
MV VALVE AREA P 1/2 METHOD: 5 CM2
PISA TR MAX VEL: 2.01 M/S
RA PRESSURE ESTIMATED: 3 MMHG
RIGHT VENTRICULAR END-DIASTOLIC DIMENSION: 1.44 CM
RV TB RVSP: 5 MMHG
TDI LATERAL: 0.19 M/S
TDI SEPTAL: 0.07 M/S
TDI: 0.13 M/S
TR MAX PG: 16 MMHG
TV REST PULMONARY ARTERY PRESSURE: 19 MMHG
Z-SCORE OF LEFT VENTRICULAR DIMENSION IN END DIASTOLE: -2.15
Z-SCORE OF LEFT VENTRICULAR DIMENSION IN END SYSTOLE: -1.28

## 2023-12-14 NOTE — PLAN OF CARE
SW consulted after Pt arrived on L & D requesting to speak to SW.  Met with her and she verbalized concerns because DCFS arrived at her home after she was admitted a few days ago.  MARCIE discussed mandated reporting and that reporters are protected by law.  Also advised her that due to her not being under the age of 18 and her infant being unborn at this time, it is not highly likely anyone would have reported accusations.  She discussed issues with custody of her female toddler and that she is concerned that report may be from the father.  SW provided support at this time.  Encourage her to contact office if further needs arise.

## 2023-12-19 ENCOUNTER — LAB VISIT (OUTPATIENT)
Dept: LAB | Facility: HOSPITAL | Age: 28
End: 2023-12-19
Attending: OBSTETRICS & GYNECOLOGY
Payer: MEDICAID

## 2023-12-19 DIAGNOSIS — Z3A.24 24 WEEKS GESTATION OF PREGNANCY: ICD-10-CM

## 2023-12-19 LAB
BASOPHILS # BLD AUTO: 0.02 K/UL (ref 0–0.2)
BASOPHILS NFR BLD: 0.3 % (ref 0–1.9)
DIFFERENTIAL METHOD: ABNORMAL
EOSINOPHIL # BLD AUTO: 0.1 K/UL (ref 0–0.5)
EOSINOPHIL NFR BLD: 1 % (ref 0–8)
ERYTHROCYTE [DISTWIDTH] IN BLOOD BY AUTOMATED COUNT: 12.8 % (ref 11.5–14.5)
FERRITIN SERPL-MCNC: 5 NG/ML (ref 20–300)
GLUCOSE SERPL-MCNC: 109 MG/DL (ref 70–140)
HCT VFR BLD AUTO: 28.7 % (ref 37–48.5)
HGB BLD-MCNC: 10 G/DL (ref 12–16)
IMM GRANULOCYTES # BLD AUTO: 0.06 K/UL (ref 0–0.04)
IMM GRANULOCYTES NFR BLD AUTO: 0.9 % (ref 0–0.5)
LYMPHOCYTES # BLD AUTO: 1.4 K/UL (ref 1–4.8)
LYMPHOCYTES NFR BLD: 20.4 % (ref 18–48)
MCH RBC QN AUTO: 32.1 PG (ref 27–31)
MCHC RBC AUTO-ENTMCNC: 34.8 G/DL (ref 32–36)
MCV RBC AUTO: 92 FL (ref 82–98)
MONOCYTES # BLD AUTO: 0.5 K/UL (ref 0.3–1)
MONOCYTES NFR BLD: 7 % (ref 4–15)
NEUTROPHILS # BLD AUTO: 4.9 K/UL (ref 1.8–7.7)
NEUTROPHILS NFR BLD: 70.4 % (ref 38–73)
NRBC BLD-RTO: 0 /100 WBC
PLATELET # BLD AUTO: 204 K/UL (ref 150–450)
PMV BLD AUTO: 9.1 FL (ref 9.2–12.9)
RBC # BLD AUTO: 3.12 M/UL (ref 4–5.4)
WBC # BLD AUTO: 7 K/UL (ref 3.9–12.7)

## 2023-12-19 PROCEDURE — 85025 COMPLETE CBC W/AUTO DIFF WBC: CPT | Performed by: OBSTETRICS & GYNECOLOGY

## 2023-12-19 PROCEDURE — 36415 COLL VENOUS BLD VENIPUNCTURE: CPT | Mod: PO | Performed by: OBSTETRICS & GYNECOLOGY

## 2023-12-19 PROCEDURE — 82950 GLUCOSE TEST: CPT | Performed by: OBSTETRICS & GYNECOLOGY

## 2023-12-19 PROCEDURE — 82728 ASSAY OF FERRITIN: CPT | Performed by: OBSTETRICS & GYNECOLOGY

## 2023-12-20 RX ORDER — FERROUS SULFATE 325(65) MG
325 TABLET ORAL
Qty: 48 TABLET | Refills: 3 | Status: SHIPPED | OUTPATIENT
Start: 2023-12-21 | End: 2024-12-20

## 2023-12-26 ENCOUNTER — PATIENT MESSAGE (OUTPATIENT)
Dept: OTHER | Facility: OTHER | Age: 28
End: 2023-12-26
Payer: MEDICAID

## 2023-12-28 ENCOUNTER — PATIENT MESSAGE (OUTPATIENT)
Dept: OBSTETRICS AND GYNECOLOGY | Facility: CLINIC | Age: 28
End: 2023-12-28
Payer: MEDICAID

## 2023-12-28 NOTE — TELEPHONE ENCOUNTER
"Notified pt to look under "view test results" on her portal. If she is not able to find it under that location, she would have to contact [facility] to get those records since that's where the test was performed. Pt voiced understanding.  "

## 2024-01-02 ENCOUNTER — HOSPITAL ENCOUNTER (EMERGENCY)
Facility: HOSPITAL | Age: 29
Discharge: HOME | End: 2024-01-02
Attending: EMERGENCY MEDICINE
Payer: MEDICAID

## 2024-01-02 ENCOUNTER — ROUTINE PRENATAL (OUTPATIENT)
Dept: OBSTETRICS AND GYNECOLOGY | Facility: CLINIC | Age: 29
End: 2024-01-02
Payer: MEDICAID

## 2024-01-02 VITALS — DIASTOLIC BLOOD PRESSURE: 56 MMHG | WEIGHT: 112.88 LBS | SYSTOLIC BLOOD PRESSURE: 98 MMHG | BODY MASS INDEX: 21.33 KG/M2

## 2024-01-02 VITALS
RESPIRATION RATE: 18 BRPM | WEIGHT: 111 LBS | SYSTOLIC BLOOD PRESSURE: 105 MMHG | OXYGEN SATURATION: 98 % | TEMPERATURE: 98 F | HEART RATE: 81 BPM | BODY MASS INDEX: 20.96 KG/M2 | DIASTOLIC BLOOD PRESSURE: 58 MMHG | HEIGHT: 61 IN

## 2024-01-02 DIAGNOSIS — N39.0 URINARY TRACT INFECTION WITHOUT HEMATURIA, SITE UNSPECIFIED: ICD-10-CM

## 2024-01-02 DIAGNOSIS — K21.9 GASTROESOPHAGEAL REFLUX DISEASE WITHOUT ESOPHAGITIS: ICD-10-CM

## 2024-01-02 DIAGNOSIS — O99.343 DEPRESSION AFFECTING PREGNANCY IN THIRD TRIMESTER, ANTEPARTUM: ICD-10-CM

## 2024-01-02 DIAGNOSIS — Z3A.30 30 WEEKS GESTATION OF PREGNANCY: ICD-10-CM

## 2024-01-02 DIAGNOSIS — Z3A.29 29 WEEKS GESTATION OF PREGNANCY: Primary | ICD-10-CM

## 2024-01-02 DIAGNOSIS — R11.2 NAUSEA AND VOMITING, UNSPECIFIED VOMITING TYPE: Primary | ICD-10-CM

## 2024-01-02 DIAGNOSIS — F32.A DEPRESSION AFFECTING PREGNANCY IN THIRD TRIMESTER, ANTEPARTUM: ICD-10-CM

## 2024-01-02 DIAGNOSIS — K92.0 HEMATEMESIS WITH NAUSEA: ICD-10-CM

## 2024-01-02 LAB
ABO + RH BLD: NORMAL
ALBUMIN SERPL BCP-MCNC: 3.3 G/DL (ref 3.5–5.2)
ALP SERPL-CCNC: 63 U/L (ref 55–135)
ALT SERPL W/O P-5'-P-CCNC: 10 U/L (ref 10–44)
ANION GAP SERPL CALC-SCNC: 8 MMOL/L (ref 8–16)
APTT PPP: 21.9 SEC (ref 21–32)
AST SERPL-CCNC: 11 U/L (ref 10–40)
BACTERIA #/AREA URNS HPF: ABNORMAL /HPF
BASOPHILS # BLD AUTO: 0.02 K/UL (ref 0–0.2)
BASOPHILS NFR BLD: 0.3 % (ref 0–1.9)
BILIRUB SERPL-MCNC: 0.2 MG/DL (ref 0.1–1)
BILIRUB UR QL STRIP: NEGATIVE
BLD GP AB SCN CELLS X3 SERPL QL: NORMAL
BUN SERPL-MCNC: 7 MG/DL (ref 6–20)
CALCIUM SERPL-MCNC: 8.3 MG/DL (ref 8.7–10.5)
CHLORIDE SERPL-SCNC: 106 MMOL/L (ref 95–110)
CLARITY UR: ABNORMAL
CO2 SERPL-SCNC: 23 MMOL/L (ref 23–29)
COLOR UR: YELLOW
CREAT SERPL-MCNC: 0.5 MG/DL (ref 0.5–1.4)
DIFFERENTIAL METHOD BLD: ABNORMAL
EOSINOPHIL # BLD AUTO: 0 K/UL (ref 0–0.5)
EOSINOPHIL NFR BLD: 0.4 % (ref 0–8)
ERYTHROCYTE [DISTWIDTH] IN BLOOD BY AUTOMATED COUNT: 12.3 % (ref 11.5–14.5)
EST. GFR  (NO RACE VARIABLE): >60 ML/MIN/1.73 M^2
GLUCOSE SERPL-MCNC: 126 MG/DL (ref 70–110)
GLUCOSE UR QL STRIP: NEGATIVE
HCT VFR BLD AUTO: 29.8 % (ref 37–48.5)
HGB BLD-MCNC: 10 G/DL (ref 12–16)
HGB UR QL STRIP: NEGATIVE
HYALINE CASTS #/AREA URNS LPF: 41 /LPF
IMM GRANULOCYTES # BLD AUTO: 0.16 K/UL (ref 0–0.04)
IMM GRANULOCYTES NFR BLD AUTO: 2.3 % (ref 0–0.5)
INR PPP: 0.9 (ref 0.8–1.2)
KETONES UR QL STRIP: ABNORMAL
LEUKOCYTE ESTERASE UR QL STRIP: ABNORMAL
LYMPHOCYTES # BLD AUTO: 1.3 K/UL (ref 1–4.8)
LYMPHOCYTES NFR BLD: 18.9 % (ref 18–48)
MCH RBC QN AUTO: 31.6 PG (ref 27–31)
MCHC RBC AUTO-ENTMCNC: 33.6 G/DL (ref 32–36)
MCV RBC AUTO: 94 FL (ref 82–98)
MICROSCOPIC COMMENT: ABNORMAL
MONOCYTES # BLD AUTO: 0.4 K/UL (ref 0.3–1)
MONOCYTES NFR BLD: 5.5 % (ref 4–15)
NEUTROPHILS # BLD AUTO: 5.1 K/UL (ref 1.8–7.7)
NEUTROPHILS NFR BLD: 72.6 % (ref 38–73)
NITRITE UR QL STRIP: NEGATIVE
NRBC BLD-RTO: 0 /100 WBC
OB PNL STL: NEGATIVE
PH UR STRIP: 7 [PH] (ref 5–8)
PLATELET # BLD AUTO: 246 K/UL (ref 150–450)
PMV BLD AUTO: 8.8 FL (ref 9.2–12.9)
POTASSIUM SERPL-SCNC: 3.5 MMOL/L (ref 3.5–5.1)
PROT SERPL-MCNC: 6.2 G/DL (ref 6–8.4)
PROT UR QL STRIP: ABNORMAL
PROTHROMBIN TIME: 9.9 SEC (ref 9–12.5)
RBC # BLD AUTO: 3.16 M/UL (ref 4–5.4)
RBC #/AREA URNS HPF: 2 /HPF (ref 0–4)
SODIUM SERPL-SCNC: 137 MMOL/L (ref 136–145)
SP GR UR STRIP: >1.03 (ref 1–1.03)
SPECIMEN OUTDATE: NORMAL
SQUAMOUS #/AREA URNS HPF: 23 /HPF
URN SPEC COLLECT METH UR: ABNORMAL
UROBILINOGEN UR STRIP-ACNC: ABNORMAL EU/DL
WBC # BLD AUTO: 7.03 K/UL (ref 3.9–12.7)
WBC #/AREA URNS HPF: 11 /HPF (ref 0–5)

## 2024-01-02 PROCEDURE — 85610 PROTHROMBIN TIME: CPT | Performed by: NURSE PRACTITIONER

## 2024-01-02 PROCEDURE — 96375 TX/PRO/DX INJ NEW DRUG ADDON: CPT

## 2024-01-02 PROCEDURE — 99999PBSHW TDAP VACCINE GREATER THAN OR EQUAL TO 7YO IM: Mod: PBBFAC,,,

## 2024-01-02 PROCEDURE — 99284 EMERGENCY DEPT VISIT MOD MDM: CPT | Mod: 25

## 2024-01-02 PROCEDURE — 85025 COMPLETE CBC W/AUTO DIFF WBC: CPT | Performed by: NURSE PRACTITIONER

## 2024-01-02 PROCEDURE — 99212 OFFICE O/P EST SF 10 MIN: CPT | Mod: PBBFAC,TH,PO | Performed by: OBSTETRICS & GYNECOLOGY

## 2024-01-02 PROCEDURE — 99999 PR PBB SHADOW E&M-EST. PATIENT-LVL II: CPT | Mod: PBBFAC,,, | Performed by: OBSTETRICS & GYNECOLOGY

## 2024-01-02 PROCEDURE — 87491 CHLMYD TRACH DNA AMP PROBE: CPT | Performed by: NURSE PRACTITIONER

## 2024-01-02 PROCEDURE — 80053 COMPREHEN METABOLIC PANEL: CPT | Performed by: NURSE PRACTITIONER

## 2024-01-02 PROCEDURE — C9113 INJ PANTOPRAZOLE SODIUM, VIA: HCPCS | Performed by: EMERGENCY MEDICINE

## 2024-01-02 PROCEDURE — 82272 OCCULT BLD FECES 1-3 TESTS: CPT | Performed by: EMERGENCY MEDICINE

## 2024-01-02 PROCEDURE — 87086 URINE CULTURE/COLONY COUNT: CPT | Performed by: NURSE PRACTITIONER

## 2024-01-02 PROCEDURE — 96374 THER/PROPH/DIAG INJ IV PUSH: CPT

## 2024-01-02 PROCEDURE — 85730 THROMBOPLASTIN TIME PARTIAL: CPT | Performed by: EMERGENCY MEDICINE

## 2024-01-02 PROCEDURE — 63600175 PHARM REV CODE 636 W HCPCS: Performed by: EMERGENCY MEDICINE

## 2024-01-02 PROCEDURE — 96361 HYDRATE IV INFUSION ADD-ON: CPT

## 2024-01-02 PROCEDURE — 81001 URINALYSIS AUTO W/SCOPE: CPT | Performed by: NURSE PRACTITIONER

## 2024-01-02 PROCEDURE — 86850 RBC ANTIBODY SCREEN: CPT | Performed by: EMERGENCY MEDICINE

## 2024-01-02 PROCEDURE — 59425 ANTEPARTUM CARE ONLY: CPT | Mod: S$PBB,TH,, | Performed by: OBSTETRICS & GYNECOLOGY

## 2024-01-02 PROCEDURE — 90471 IMMUNIZATION ADMIN: CPT | Mod: PBBFAC,PO

## 2024-01-02 RX ORDER — METOCLOPRAMIDE 10 MG/1
10 TABLET ORAL EVERY 6 HOURS
Qty: 30 TABLET | Refills: 0 | Status: SHIPPED | OUTPATIENT
Start: 2024-01-02 | End: 2024-02-15

## 2024-01-02 RX ORDER — PANTOPRAZOLE SODIUM 40 MG/10ML
80 INJECTION, POWDER, LYOPHILIZED, FOR SOLUTION INTRAVENOUS
Status: COMPLETED | OUTPATIENT
Start: 2024-01-02 | End: 2024-01-02

## 2024-01-02 RX ORDER — METOCLOPRAMIDE HYDROCHLORIDE 5 MG/ML
10 INJECTION INTRAMUSCULAR; INTRAVENOUS
Status: COMPLETED | OUTPATIENT
Start: 2024-01-02 | End: 2024-01-02

## 2024-01-02 RX ORDER — CEPHALEXIN 500 MG/1
500 CAPSULE ORAL 4 TIMES DAILY
Qty: 20 CAPSULE | Refills: 0 | Status: SHIPPED | OUTPATIENT
Start: 2024-01-02 | End: 2024-01-07

## 2024-01-02 RX ADMIN — PANTOPRAZOLE SODIUM 80 MG: 40 INJECTION, POWDER, FOR SOLUTION INTRAVENOUS at 02:01

## 2024-01-02 RX ADMIN — SODIUM CHLORIDE, SODIUM LACTATE, POTASSIUM CHLORIDE, AND CALCIUM CHLORIDE 1000 ML: .6; .31; .03; .02 INJECTION, SOLUTION INTRAVENOUS at 03:01

## 2024-01-02 RX ADMIN — METOCLOPRAMIDE 10 MG: 5 INJECTION, SOLUTION INTRAMUSCULAR; INTRAVENOUS at 02:01

## 2024-01-02 NOTE — PROGRESS NOTES
Prenatal Note   Chief Complaint:  Routine Prenatal Visit       Patient ID: Josefa Oliver is a  28 y.o. female.    Date: 2024    TODAY'S PROGRESS NOTE    S/ 28 y.o. y.o.  at 29-0/7 weeks who presents for routine prenatal evaluation.    She denies vaginal bleeding, signs of rupture of membranes or significant uterine contractions.    She continues to have some generalized discomfort which she feels are secondary to GI issues.  She reports normal FM.    Since her last OB evaluation she was observed at the hospital due to trauma, falling down stairs with no subsequent issues noted..    Currently she continues to have GI issues with persistent nausea and GERD.  Last night she reports an episode of hematemesis.    O/  VITALS:  Weight:  111 lb  BP:  105/58    FH: 29 cm  DT'S: 140's bpm by doppler    A/P  Intrauterine pregnancy at 29-0/7 weeks  Reflux  Depression  Syncopal episodes    The above was reviewed discussed with the patient.    In light of her recent hematemesis and significant GI issues she was referred to the hospital for further evaluation.    There have been no further syncopal episodes  Her depression remained stable on her current medications    We will base further evaluation and treatment of the patient's status over time.    The patient received the Tdap vaccine today without difficulty.  In addition the patient inadvertently received RhoGAM.      The patient's questions regarding the above were answered and she is in agreement with the current plan.     OB PROBLEM LIST:  Depression:  Currently under care of Psychiatry ([phone]) and on Lexapro 20 mg, Klonopin 0.5 mg PRN & Lamictal 25 mg (increased at this time secondary to pregnancy and depression over loss of 1st child.)  Syncopal episode: seen by Cardiology  History of HSV: Will initiate prophylaxis at 36 weeks  LGSIL on Pap smear     FINAL EDC:    3/19/2024 by US done 2023 @ 12-7 weeks      SO Name: Favian Cosby ANATOMY  SCAN:    SIZE = DATES (EFW = 419 gms at 13% and AC 11%)  ANATOMY: No fetal structural abnormalities appreciated but incomplete fetal anatomical survey  PLACENTA: Posterior placenta and placental edge to cervix appears wnl per transabdominal ultrasound but suboptimal visualization  OTHER: Cervical length screen via transabdominal ultrasound wnl.    INITIAL LABS:    MBT: A positive  AB SCREEN: negative  RPR: negative  RUBELLA: Immune  HEPATITIS A/B/C: negative  HIV: negative  CBC: DATE: 11/8/2023         7.5 >--- 11.5 / 32.4 ---< 215  HGB: normal  URINE CX: negative  Other:      10-12 WEEK LABS:    PAP: low-grade squamous intraepithelial neoplasia (LGSIL - encompassing HPV,mild dysplasia,RENA I) / Date: 7/10/2023    ALLA/CHLAM: negative x 2    cfDNA ([id number]):  Negative per patient    CARRIER SCREEN (Inheritest Core):  Patient reports previous testing for negative carrier status   28 WEEK LABS:    12/19/2023  Ferritin: 5  1 hour OGTT: 109  CBC noted: 7 > 10 / 28.7 < 204       OTHER LABS:     OTHER ULTRASOUNDS:     TO-DO THROUGHOUT PREGNANCY:    Influenza vaccine (OCT-MAY): ___  TDAP (27-36wks): 1/2/2024    Birth Plan: ___  Plans to breastfeed: ___  Plans for epidural: ___  Classes at hospital: ___    Postpartum contraception: ___  Consent for delivery: ___  TOLAC counseling: ___  BTL counseling: ___   MEDICATIONS:    Prenatal vitamins  Promethazine 25 mg tablet   Lexapro 20 mg  Klonopin 0.5 mg PRN  Lamictal 25 mg  ALLERGIES:    Clindamycin: Shortness of breath and hives    MEDICAL HISTORY:    Depression, anxiety and PTSD.  Pre-pregnancy BMI = 19.3 SURGICAL HISTORY:    Negative    SOCIAL HISTORY:    Smoker:  Positive for vaping  Alcohol: denies  Drugs: denies  Relationship: co-habitating  Domestic Violence: no  Lives with:  FOB and four children  Education Level: Some College  Occupation: homemaker  Islam:  Islam  Other:   GYN HISTORY:    PAP'S: no h/o abnormals  STI'S: no past history  GENITAL HSV:  The  patient reports previous positive testing but no reported outbreaks.  She was placed on HSV prophylaxis with previous pregnancies FAMILY HISTORY:    HTN: Yes - mother  DIABETES: Yes - mother and sibling  BLEEDING D/O: No  CLOTTING D/O: No  BIRTH DEFECTS: No  MENTAL DISABILITY: No  TWINS OR MORE: No  GYN CANCER: No  Other:     OBSTETRIC HISTORY   Month/Year Mode of Delivery EGA Wt. M/F Complications / Comments   2015    Male Now    2018    Male    2019    Female                      Plan:      29 weeks gestation of pregnancy  -     Tdap Vaccine    Gastroesophageal reflux disease without esophagitis    Depression affecting pregnancy in second trimester, antepartum    Hematemesis with nausea      Follow up in about 2 weeks (around 2024) for Follow-up on today's evaluation, Routine OB F/U or as needed.     Pilo Peng MD  Department OBGYN  [facility]      FAMILY LINEAGE AND HISTORY:  Ashkenazi or [employer]:  No  Intellectual disability:  No  Premature ovarian failure (<40yoa):  No  Cajun or [place]:  No    MISCELLANEOUS:  Does the patient have cats? No

## 2024-01-02 NOTE — FIRST PROVIDER EVALUATION
Emergency Department TeleTriage Encounter Note      CHIEF COMPLAINT    Chief Complaint   Patient presents with    Emesis     29 weeks pregnant, started vomiting dark red emesis yesterday sent by Dr. Barton states she hasn't felt baby move today but did hear a heartbeat today.        VITAL SIGNS   Initial Vitals [01/02/24 1119]   BP Pulse Resp Temp SpO2   106/62 93 17 98.7 °F (37.1 °C) 99 %      MAP       --            ALLERGIES    Review of patient's allergies indicates:   Allergen Reactions    Clindamycin Other (See Comments) and Rash       PROVIDER TRIAGE NOTE  Verbal consent for the teletriage evaluation was given by the patient at the start of the evaluation.  All efforts will be made to maintain patient's privacy during the evaluation.      This is a teletriage evaluation of a 28 y.o. female presenting to the ED with c/o vomiting blood last night (2 episodes, none today).  Sent by OBGYN for further evaluation.  Had an appointment with OB today, positive FHTs.  Approximately 29 weeks pregnant. Limited physical exam via telehealth: The patient is awake, alert, answering questions appropriately and is not in respiratory distress.  As the Teletriage provider, I performed an initial assessment and ordered appropriate labs and imaging studies, if any, to facilitate the patient's care once placed in the ED. Once a room is available, care and a full evaluation will be completed by an alternate ED provider.  Any additional orders and the final disposition will be determined by that provider.  All imaging and labs will not be followed-up by the Teletriage Team, including myself.          ORDERS  Labs Reviewed   CBC W/ AUTO DIFFERENTIAL   COMPREHENSIVE METABOLIC PANEL   URINALYSIS, REFLEX TO URINE CULTURE   PROTIME-INR       ED Orders (720h ago, onward)      Start Ordered     Status Ordering Provider    01/02/24 1148 01/02/24 1147  Protime-INR  STAT         Ordered ELDON LENZ    01/02/24 1148 01/02/24 1147  Assess  fetal heart tones  Until discontinued         Ordered DELFINELDON    01/02/24 1147 01/02/24 1147  Saline lock IV  Once         Ordered DELFIN, ELDON CHANDLER    01/02/24 1147 01/02/24 1147  CBC auto differential  STAT         Ordered ELDON LENZ    01/02/24 1147 01/02/24 1147  Comprehensive metabolic panel  STAT         Ordered ELDON LENZ    01/02/24 1147 01/02/24 1147  Urinalysis, Reflex to Urine Culture Urine, Clean Catch  STAT         Ordered ELDON LENZ              Virtual Visit Note: The provider triage portion of this emergency department evaluation and documentation was performed via [employer], a HIPAA-compliant telemedicine application, in concert with a tele-presenter in the room. A face to face patient evaluation with one of my colleagues will occur once the patient is placed in an emergency department room.      DISCLAIMER: This note was prepared with [employer] voice recognition transcription software. Garbled syntax, mangled pronouns, and other bizarre constructions may be attributed to that software system.

## 2024-01-02 NOTE — ED PROVIDER NOTES
Encounter Date: 1/2/2024       History     Chief Complaint   Patient presents with    Emesis     29 weeks pregnant, started vomiting dark red emesis yesterday sent by Dr. Barton states she hasn't felt baby move today but did hear a heartbeat today.      28-year-old female presented emergency department with the generalized malaise and nausea vomiting.  Patient said she vomited dark colored vomit this morning and concern for possible bleeding.  Patient was seen by OBGYN.  Patient is 29 weeks pregnant.  Patient seen by OBGYN and sent here for blood work and further evaluation.  Patient states she has slight nausea.  Denies any abdominal pain.  Denies any weakness or numbness or fever or chills or dysuria or hematuria.  Patient denies any dark stools.      Review of patient's allergies indicates:   Allergen Reactions    Clindamycin Other (See Comments) and Rash     Past Medical History:   Diagnosis Date    Abnormal Pap smear of cervix     Fall (on) (from) other stairs and steps, initial encounter 12/05/2023     No past surgical history on file.  No family history on file.  Social History     Tobacco Use    Smoking status: Some Days     Types: Vaping with nicotine    Smokeless tobacco: Never   Substance Use Topics    Alcohol use: Not Currently    Drug use: Never     Review of Systems   Constitutional:  Positive for fatigue.   HENT: Negative.     Eyes: Negative.    Respiratory: Negative.     Cardiovascular: Negative.  Negative for chest pain.   Gastrointestinal:  Positive for nausea and vomiting.   Endocrine: Negative.    Genitourinary: Negative.    Musculoskeletal: Negative.    Skin: Negative.    Allergic/Immunologic: Negative.    Neurological: Negative.    Hematological: Negative.    Psychiatric/Behavioral: Negative.     All other systems reviewed and are negative.      Physical Exam     Initial Vitals [01/02/24 1119]   BP Pulse Resp Temp SpO2   106/62 93 17 98.7 °F (37.1 °C) 99 %      MAP       --         Physical  Exam    Nursing note and vitals reviewed.  Constitutional: She appears well-developed and well-nourished.   HENT:   Head: Normocephalic and atraumatic.   Nose: Nose normal.   Mouth/Throat: Oropharynx is clear and moist.   Eyes: Conjunctivae and EOM are normal. Pupils are equal, round, and reactive to light.   Neck: Neck supple. No thyromegaly present. No tracheal deviation present. No JVD present.   Normal range of motion.  Cardiovascular:  Normal rate, regular rhythm, normal heart sounds and intact distal pulses.           No murmur heard.  Pulmonary/Chest: Breath sounds normal. No stridor. No respiratory distress. She has no wheezes. She has no rales.   Abdominal: Abdomen is soft. Bowel sounds are normal. There is no abdominal tenderness.   Gravid uterus noted There is no guarding.   Musculoskeletal:         General: No edema. Normal range of motion.      Cervical back: Normal range of motion and neck supple.     Neurological: She is alert and oriented to person, place, and time. GCS score is 15. GCS eye subscore is 4. GCS verbal subscore is 5. GCS motor subscore is 6.   Skin: Skin is warm. Capillary refill takes less than 2 seconds.   Psychiatric: She has a normal mood and affect. Thought content normal.         ED Course   Procedures  Labs Reviewed   CBC W/ AUTO DIFFERENTIAL - Abnormal; Notable for the following components:       Result Value    RBC 3.16 (*)     Hemoglobin 10.0 (*)     Hematocrit 29.8 (*)     MCH 31.6 (*)     MPV 8.8 (*)     Immature Granulocytes 2.3 (*)     Immature Grans (Abs) 0.16 (*)     All other components within normal limits   COMPREHENSIVE METABOLIC PANEL - Abnormal; Notable for the following components:    Glucose 126 (*)     Calcium 8.3 (*)     Albumin 3.3 (*)     All other components within normal limits   URINALYSIS, REFLEX TO URINE CULTURE - Abnormal; Notable for the following components:    Appearance, UA Hazy (*)     Specific Gravity, UA >1.030 (*)     Protein, UA 1+ (*)      Ketones, UA Trace (*)     Urobilinogen, UA 2.0-3.0 (*)     Leukocytes, UA 1+ (*)     All other components within normal limits    Narrative:     Specimen Source->Urine   URINALYSIS MICROSCOPIC - Abnormal; Notable for the following components:    WBC, UA 11 (*)     Hyaline Casts, UA 41 (*)     All other components within normal limits    Narrative:     Specimen Source->Urine   C. TRACHOMATIS/N. GONORRHOEAE BY AMP DNA   CULTURE, URINE   C. TRACHOMATIS/N. GONORRHOEAE BY AMP DNA   PROTIME-INR   OCCULT BLOOD X 1, STOOL   APTT   TYPE & SCREEN          Imaging Results    None          Medications   lactated ringers bolus 1,000 mL (1,000 mLs Intravenous New Bag 1/2/24 1516)   lactated ringers bolus 1,000 mL (has no administration in time range)   pantoprazole injection 80 mg (80 mg Intravenous Given 1/2/24 1422)   metoclopramide injection 10 mg (10 mg Intravenous Given 1/2/24 1421)     Medical Decision Making  28-year-old female with nausea and vomiting and vomited dark brown stuff so concerned about vomiting blood so came here.  Rectal exam did not show evidence of any bleeding.  Patient hemodynamically stable.  Patient's symptoms resolved with treatment and patient is pregnant and no active vomiting noted at this time.  Hemoglobin is stable and patient is hemodynamically stable.  Screening labs and workup reviewed.  Given patient has no active bleeding and no evidence of bleeding at this time and hemoglobin stable and not orthostatic this is likely secondary to gastric contents being acidic which appear to be dark and may not be a GI bleed.  Discharged with return precautions and instructions and follow-up.  Initially Protonix given and Reglan given.    Amount and/or Complexity of Data Reviewed  Labs: ordered. Decision-making details documented in ED Course.    Risk  Prescription drug management.                                      Clinical Impression:  Final diagnoses:  [R11.2] Nausea and vomiting, unspecified vomiting  type (Primary)  [N39.0] Urinary tract infection without hematuria, site unspecified  [Z3A.30] 30 weeks gestation of pregnancy          ED Disposition Condition    Discharge Stable          ED Prescriptions       Medication Sig Dispense Start Date End Date Auth. Provider    metoclopramide HCl (REGLAN) 10 MG tablet Take 1 tablet (10 mg total) by mouth every 6 (six) hours. 30 tablet 1/2/2024 -- Kathy Perry MD    cephALEXin (KEFLEX) 500 MG capsule Take 1 capsule (500 mg total) by mouth 4 (four) times daily. for 5 days 20 capsule 1/2/2024 1/7/2024 Kathy Perry MD          Follow-up Information       Follow up With Specialties Details Why Contact Info    Ted Hess NP Family Medicine In 1 day  [address]  [place] [address]  [phone]               Kathy Perry MD  01/02/24 6170

## 2024-01-02 NOTE — DISCHARGE INSTRUCTIONS
Drink lots of fluids.  Rest.  Follow-up with your OBGYN.  Return to emergency department for worsening symptoms or any problems

## 2024-01-05 LAB
BACTERIA UR CULT: NO GROWTH
CHLAMYDIA, AMPLIFIED DNA: NEGATIVE
N GONORRHOEAE, AMPLIFIED DNA: NEGATIVE

## 2024-01-09 ENCOUNTER — PATIENT MESSAGE (OUTPATIENT)
Dept: OTHER | Facility: OTHER | Age: 29
End: 2024-01-09
Payer: MEDICAID

## 2024-01-16 ENCOUNTER — ROUTINE PRENATAL (OUTPATIENT)
Dept: OBSTETRICS AND GYNECOLOGY | Facility: CLINIC | Age: 29
End: 2024-01-16
Payer: MEDICAID

## 2024-01-16 ENCOUNTER — HOSPITAL ENCOUNTER (OUTPATIENT)
Dept: OBSTETRICS AND GYNECOLOGY | Facility: CLINIC | Age: 29
Discharge: HOME | End: 2024-01-16
Attending: OBSTETRICS & GYNECOLOGY
Payer: MEDICAID

## 2024-01-16 VITALS — SYSTOLIC BLOOD PRESSURE: 92 MMHG | DIASTOLIC BLOOD PRESSURE: 58 MMHG | BODY MASS INDEX: 21.83 KG/M2 | WEIGHT: 115.5 LBS

## 2024-01-16 DIAGNOSIS — N89.8 VAGINAL DISCHARGE DURING PREGNANCY IN THIRD TRIMESTER: ICD-10-CM

## 2024-01-16 DIAGNOSIS — O26.843 UTERINE SIZE-DATE DISCREPANCY IN THIRD TRIMESTER: ICD-10-CM

## 2024-01-16 DIAGNOSIS — O26.893 VAGINAL DISCHARGE DURING PREGNANCY IN THIRD TRIMESTER: ICD-10-CM

## 2024-01-16 DIAGNOSIS — F32.A DEPRESSION AFFECTING PREGNANCY IN THIRD TRIMESTER, ANTEPARTUM: ICD-10-CM

## 2024-01-16 DIAGNOSIS — O99.343 DEPRESSION AFFECTING PREGNANCY IN THIRD TRIMESTER, ANTEPARTUM: ICD-10-CM

## 2024-01-16 DIAGNOSIS — Z3A.31 31 WEEKS GESTATION OF PREGNANCY: Primary | ICD-10-CM

## 2024-01-16 PROCEDURE — 99999PBSHW PR PBB SHADOW TECHNICAL ONLY FILED TO HB: Mod: PBBFAC,,,

## 2024-01-16 PROCEDURE — 83986 ASSAY PH BODY FLUID NOS: CPT | Mod: PBBFAC,PO | Performed by: OBSTETRICS & GYNECOLOGY

## 2024-01-16 PROCEDURE — 76816 OB US FOLLOW-UP PER FETUS: CPT | Mod: 26,,, | Performed by: STUDENT IN AN ORGANIZED HEALTH CARE EDUCATION/TRAINING PROGRAM

## 2024-01-16 PROCEDURE — 81514 NFCT DS BV&VAGINITIS DNA ALG: CPT | Performed by: OBSTETRICS & GYNECOLOGY

## 2024-01-16 PROCEDURE — 99999 PR PBB SHADOW E&M-EST. PATIENT-LVL III: CPT | Mod: PBBFAC,,, | Performed by: OBSTETRICS & GYNECOLOGY

## 2024-01-16 PROCEDURE — 59425 ANTEPARTUM CARE ONLY: CPT | Mod: S$PBB,TH,, | Performed by: OBSTETRICS & GYNECOLOGY

## 2024-01-16 PROCEDURE — 99213 OFFICE O/P EST LOW 20 MIN: CPT | Mod: PBBFAC,TH,PO | Performed by: OBSTETRICS & GYNECOLOGY

## 2024-01-16 NOTE — PROGRESS NOTES
"    Prenatal Note   Chief Complaint:  Routine Prenatal Visit       Patient ID: Josefa Oliver is a  28 y.o. female.    Date: 2024    TODAY'S PROGRESS NOTE    S/ 28 y.o. y.o.  at 31-0/7 weeks who presents for routine prenatal evaluation.      Following her last evaluation she was seen in the emergency room at [facility] with a negative evaluation for GI bleed.    She reports appropriate fetal movement.  She denies any vaginal bleeding.  She continues to note occasional irregular uterine contractions.  Over the last few weeks she is noted "fluid from vagina".  She is unsure as to whether this is rupture of membranes.    From a depression standpoint she denies any suicidal or homicidal thoughts but reports she is in a significant amount of stress due to social issues.  This involves custody of her daughter.  The patient denies any illicit drug usage.    No episodes of hematemesis since her last evaluation.  No recent syncopal or urologic issues.    O/  VITALS:  Weight:  116 lb  BP:  92/58    FH: 28 cm  DT'S: 140's bpm by doppler    Speculum exam notes no pooling cervix is closed.  Nitrazine negative   Vaginitis panel obtained.  No external abnormalities noted.    Ultrasound performed.  Final results pending but preliminary evaluation notes a single intrauterine pregnancy at 30-1/7 weeks, 3 lb 5 oz with appropriate AMY.    A/P  Intrauterine pregnancy at 31-0/7 weeks  Reflux  Depression  Vaginal discharge    The above was reviewed discussed with the patient.    We discussed the findings on physical exam which do not appear to be compatible with rupture of membranes.  A vaginitis panel was obtained.    We discussed the possibility of sporadic episodes of urinary incontinence leading to the watery vaginal discharge.    Despite the small measurement of the uterus fetal growth appears appropriate based on ultrasound today.  Final report pending.    We reviewed the patient's current psychiatric " medications and overall current psychiatric situations.    We reviewed the signs and symptoms of labor as well as indications to present for evaluation at the hospital.    The patient's questions regarding the above were answered and she is in agreement with the current plan.     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    INITIAL LABS:    MBT: A positive  AB SCREEN: negative  RPR: negative  RUBELLA: Immune  HEPATITIS A/B/C: negative  HIV: negative  CBC: DATE: 11/8/2023         7.5 >--- 11.5 / 32.4 ---< 215  HGB: normal  URINE CX: negative  Other:      10-12 WEEK LABS:    PAP: low-grade squamous intraepithelial neoplasia (LGSIL - encompassing HPV,mild dysplasia,RENA I) / Date: 7/10/2023    ALLA/CHLAM: negative x 2    cfDNA ([id number]):  Negative per patient    CARRIER SCREEN (Inherit[employer] Core):  Patient reports previous testing for negative carrier status   28 WEEK LABS:    12/19/2023  Ferritin: 5  1 hour OGTT: 109  CBC noted: 7 > 10 / 28.7 < 204       OTHER LABS:     OTHER ULTRASOUNDS:    01/26/2024:  Preliminary results of ultrasound and a single intrauterine pregnancy at 30-1/7 weeks, 3 lb 5 oz with inappropriate AMY. TO-DO THROUGHOUT PREGNANCY:    Influenza vaccine (OCT-MAY):  ___  TDAP (27-36wks): 2024    Birth Plan: ___  Plans to breastfeed: ___  Plans for epidural: ___  Classes at hospital: ___    Postpartum contraception: ___  Consent for delivery: ___    BTL counseling: ___   MEDICATIONS:    Prenatal vitamins  Promethazine 25 mg tablet   Lexapro 20 mg  Klonopin 0.5 mg PRN  Lamictal 25 mg  ALLERGIES:    Clindamycin: Shortness of breath and hives    MEDICAL HISTORY:    Depression, anxiety and PTSD.  Pre-pregnancy BMI = 19.3 SURGICAL HISTORY:    Negative    SOCIAL HISTORY:    Smoker:  Positive for vaping  Alcohol: denies  Drugs: denies  Relationship: co-habitating  Domestic Violence: no  Lives with:  FOB and four children  Education Level: Some College  Occupation: homemaker  Mormonism:  Confucianism  Other:   GYN HISTORY:    PAP'S: no h/o abnormals  STI'S: no past history  GENITAL HSV:  The patient reports previous positive testing but no reported outbreaks.  She was placed on HSV prophylaxis with previous pregnancies FAMILY HISTORY:    HTN: Yes - mother  DIABETES: Yes - mother and sibling  BLEEDING D/O: No  CLOTTING D/O: No  BIRTH DEFECTS: No  MENTAL DISABILITY: No  TWINS OR MORE: No  GYN CANCER: No  Other:     OBSTETRIC HISTORY   Month/Year Mode of Delivery EGA Wt. M/F Complications / Comments   2015    Male Now    2018    Male    2019    Female                      Plan:      31 weeks gestation of pregnancy    Depression affecting pregnancy in third trimester, antepartum    Uterine size-date discrepancy in third trimester  -     US OB/GYN Procedure ([facility]) - Extended List; Standing    Vaginal discharge during pregnancy in third trimester  -     Vaginosis Screen by DNA Probe      Follow up in about 4 weeks (around 2024) for Routine OB F/U or as needed.     Pilo Peng MD  Department OBGYN  [facility]      FAMILY LINEAGE AND HISTORY:  Ashkenazi or [place] American Moravian:  No  Intellectual disability:  No  Premature ovarian failure  (<40yoa):  No  Cajun or [place]:  No    MISCELLANEOUS:  Does the patient have cats? No

## 2024-01-16 NOTE — PATIENT INSTRUCTIONS
Have a question or concern?    PRO TIP: Program these phone numbers in your cell phone!    for an emergency  call 911 or go to the nearest hospital Especially after 20 weeks of the pregnancy, please remember that  Labor & Delivery is at [facility].  There is no L&D at [facility] ([facility]).   [facility] Nurse Care Advice Line  [phone] At any time during your pregnancy,  you can speak to a nurse 24-7.   for non-urgent issues, send us a  message in CEINT Consider calling the Nurse Care Advice Line if it's a weekend or  toward the end of the work-day since  [employer] and phone messages may NOT be answered for a day or two.   for non-urgent issues, call the clinic  [phone], Option 3    Labor & Delivery  [phone] Starting at 20 weeks of the pregnancy,  you can speak to a nurse on L&D 24-7.     THIRD TRIMESTER  29+0 - 42 weeks    Adapting to Pregnancy: Third Trimester   Some physical discomforts may seem worse in the final weeks of pregnancy. Simple lifestyle changes can help as you keep good posture and use good body mechanics.  Pay attention to your changing center of gravity.  Be kind to yourself and know your limits - your body is hosting an entire other human!  Ask for help if you need it.  Take fiber supplements, drink lots of water, and avoid prolonged sitting or standing to prevent constipation and hemorrhoids.  Be sure you're getting enough rest: avoid caffeine after 3pm, use a warm bath to relax before bedtime, ask for back/neck/shoulder massages from your partner, try drinking warm decaf tea or milk at bedtime, get heartburn under control.  Speaking of heartburnavoid spicy / acidic / sugary foods, especially in the evenings.  Eat slowly and in small amounts, and avoiding eating during the 2 hours before bedtime.  Try sleeping with your upper body elevated.    Your To-Do List  If you haven't already, get these important things done!  A few new tasks include having the  carseat ready, having hospital bags packed, and making arrangements if needed for other children and/or pets while you're in the hospital.  We'll ask you to pre-register at the hospital around 36 weeks so you have a little less paperwork to do when the big day arrives.    More information on these topics can be found in your OB Welcome Packet and the A-Z Book:  Choose a pediatrician for your baby.  Sign up for a tour and classes at the hospital.  All classes are free of charge if you are delivering at [facility].  Baby Love (learn about the delivery process and caring for a )  Big Brother / Big Sister Class (to help siblings prepare for baby)  Lamaze (a 4 week class to learn about natural interventions for labor)  Breastfeeding (get a head start learning about breastfeeding)  Work on some methods for coping with the pains of labor.  A good bit of labor happens before you can get an epidural, and you'll feel more confident if you have a plan that you've practiced.  We encourage everyone to breastfeed if they can (and most women can!).  Please ask us questions if you have them.  Decide what you'd like to use for contraception (birth control) after this baby is born.  If you're having a boy, let us know if you plan to have him circumcised.    Things to Look Out For  The Four Questions (please read more about these in your OB Welcome Packet): 1) Baby's Movements, 2) Contractions / Cramping, 3) Vaginal bleeding, 4) Leaking fluids  Mood swings and depression - some mood swings are expected in pregnancy, but please ask for help if you are feeling overwhelmed or sad all the time.  Headaches that don't improve with rest, drinking water, and tylenol.  Severe swelling, especially of the face and hands. Remember some swelling of the lower legs is normal, especially if you have been on your feet for some time.    Intimacy   Unless your doctor tells you not to, it's still fine to continue having sex. The third trimester though  can be a challenge comfort-wise. Try different positions and see what's best for you.    Baby!  Baby kicks and stretches despite running low on room, lanugo disappears, baby gains about a half of a pound per week, and bones harden (except for the skull, which remains soft and flexible for delivery).

## 2024-01-17 LAB
BACTERIAL VAGINOSIS DNA: NEGATIVE
CANDIDA GLABRATA DNA: NEGATIVE
CANDIDA KRUSEI DNA: NEGATIVE
CANDIDA RRNA VAG QL PROBE: NEGATIVE
T VAGINALIS RRNA GENITAL QL PROBE: NEGATIVE

## 2024-01-23 ENCOUNTER — PATIENT MESSAGE (OUTPATIENT)
Dept: OBSTETRICS AND GYNECOLOGY | Facility: CLINIC | Age: 29
End: 2024-01-23
Payer: MEDICAID

## 2024-01-23 ENCOUNTER — PATIENT MESSAGE (OUTPATIENT)
Dept: OTHER | Facility: OTHER | Age: 29
End: 2024-01-23
Payer: MEDICAID

## 2024-02-13 ENCOUNTER — PATIENT MESSAGE (OUTPATIENT)
Dept: OTHER | Facility: OTHER | Age: 29
End: 2024-02-13
Payer: MEDICAID

## 2024-02-15 ENCOUNTER — CLINICAL SUPPORT (OUTPATIENT)
Dept: OBSTETRICS AND GYNECOLOGY | Facility: CLINIC | Age: 29
End: 2024-02-15
Payer: MEDICAID

## 2024-02-15 ENCOUNTER — HOSPITAL ENCOUNTER (OUTPATIENT)
Dept: OBSTETRICS AND GYNECOLOGY | Facility: CLINIC | Age: 29
Discharge: HOME | End: 2024-02-15
Attending: OBSTETRICS & GYNECOLOGY
Payer: MEDICAID

## 2024-02-15 ENCOUNTER — ROUTINE PRENATAL (OUTPATIENT)
Dept: OBSTETRICS AND GYNECOLOGY | Facility: CLINIC | Age: 29
End: 2024-02-15
Payer: MEDICAID

## 2024-02-15 VITALS
WEIGHT: 124.13 LBS | DIASTOLIC BLOOD PRESSURE: 74 MMHG | SYSTOLIC BLOOD PRESSURE: 110 MMHG | BODY MASS INDEX: 23.45 KG/M2

## 2024-02-15 DIAGNOSIS — B00.9 HSV (HERPES SIMPLEX VIRUS) INFECTION: ICD-10-CM

## 2024-02-15 DIAGNOSIS — Z3A.35 35 WEEKS GESTATION OF PREGNANCY: Primary | ICD-10-CM

## 2024-02-15 DIAGNOSIS — O26.843 UTERINE SIZE-DATE DISCREPANCY IN THIRD TRIMESTER: ICD-10-CM

## 2024-02-15 DIAGNOSIS — O36.5990 FETAL GROWTH RESTRICTION ANTEPARTUM: ICD-10-CM

## 2024-02-15 DIAGNOSIS — K21.9 GASTROESOPHAGEAL REFLUX DISEASE WITHOUT ESOPHAGITIS: ICD-10-CM

## 2024-02-15 DIAGNOSIS — O09.90 SUPERVISION OF HIGH RISK PREGNANCY, ANTEPARTUM: Primary | ICD-10-CM

## 2024-02-15 PROCEDURE — 99499 UNLISTED E&M SERVICE: CPT | Mod: S$PBB,,, | Performed by: GENERAL PRACTICE

## 2024-02-15 PROCEDURE — 76820 UMBILICAL ARTERY ECHO: CPT | Mod: 26,,, | Performed by: OBSTETRICS & GYNECOLOGY

## 2024-02-15 PROCEDURE — 99999 PR PBB SHADOW E&M-EST. PATIENT-LVL II: CPT | Mod: PBBFAC,,, | Performed by: OBSTETRICS & GYNECOLOGY

## 2024-02-15 PROCEDURE — 99212 OFFICE O/P EST SF 10 MIN: CPT | Mod: PBBFAC,TH,PO | Performed by: OBSTETRICS & GYNECOLOGY

## 2024-02-15 PROCEDURE — 59025 FETAL NON-STRESS TEST: CPT | Mod: PBBFAC,PO | Performed by: GENERAL PRACTICE

## 2024-02-15 PROCEDURE — 76816 OB US FOLLOW-UP PER FETUS: CPT | Mod: 26,,, | Performed by: OBSTETRICS & GYNECOLOGY

## 2024-02-15 PROCEDURE — 76819 FETAL BIOPHYS PROFIL W/O NST: CPT | Mod: 26,,, | Performed by: OBSTETRICS & GYNECOLOGY

## 2024-02-15 PROCEDURE — 59426 ANTEPARTUM CARE ONLY: CPT | Mod: TH,S$PBB,, | Performed by: OBSTETRICS & GYNECOLOGY

## 2024-02-15 PROCEDURE — 59025 FETAL NON-STRESS TEST: CPT | Mod: 26,S$PBB,, | Performed by: GENERAL PRACTICE

## 2024-02-15 RX ORDER — PRAZOSIN HYDROCHLORIDE 1 MG/1
1 CAPSULE ORAL
COMMUNITY
Start: 2024-01-24 | End: 2024-04-23

## 2024-02-15 RX ORDER — VALACYCLOVIR HYDROCHLORIDE 500 MG/1
500 TABLET, FILM COATED ORAL 2 TIMES DAILY
Qty: 60 TABLET | Refills: 11 | Status: SHIPPED | OUTPATIENT
Start: 2024-02-15 | End: 2024-02-22

## 2024-02-15 NOTE — PROGRESS NOTES
28 y.o.  at 35w2d who presents for NST for BPP  today.  BPP and growth US had been ordered for FH measuring small for dates at [facility] today.  EFW was 8th percentile, fluid was normal.  Dopplers were normal.    She denies VB, LOF, CTX's.  She reports normal FM..    Visit Vitals  LMP 2023 (Exact Date)        NST duration = ~20 minutes  FHR: 115bpm baseline, moderate variability, spont accels, no decels  TOCO: no CTX's    A/P Reassuring / reactive NST.    -weekly BPP + dopplers for FGR 8th percent (q THU)  -weekly NST (q MON)  -deliver @ 38wks unless indicated sooner  -continue other routine appointments as scheduled  -PTL precautions and importance of FMC stressed  -patient voiced understanding and agreement to plan    MD ANDREW

## 2024-02-15 NOTE — PROGRESS NOTES
"    Prenatal Note   Chief Complaint:  Routine Prenatal Visit (35w2d, c/o indigestion unrelieved with Prilosec, naomi wright pain at night)       Patient ID: Josefa Oliver is a  28 y.o. female.    Date: 02/15/2024    TODAY'S PROGRESS NOTE    S/ 28 y.o. y.o.  at 35-2/7 weeks who presents for routine prenatal evaluation.      Occasional [place] Wright.    No vaginal bleeding.  No ruptured membranes.    Good fetal movement.    The patient continues to have issues with GERD which she had been unresponsive to over-the-counter medications.  She has been on Prilosec.    The patient reports no HSV outbreaks but being told in previous pregnancies that she was "positive".    Depression is currently stable.    O/  VITALS:  Weight:  124 lb  BP:  110/74    FH: 31 cm  DT'S: 140's bpm by doppler    A/P  Intrauterine pregnancy at 35-2/7 weeks  Reflux  Depression  ?  HSV history    The above was reviewed and discussed with the patient and her family.    In light of the patient's past issues during this pregnancy, size less than dates and significant depression ultrasound has been ordered to once again evaluate fetal status and estimated fetal weight.      We discussed the pros cons risks benefits of HSV prophylaxis with Valtrex in pregnancy.  While the patient is unsure as to whether she actually has HSV will proceed with prophylaxis via Valtrex 500 mg q.day.  The pros, cons, risks, benefits, alternatives and indications of the medication(s) prescribed, as well as appropriate use and potential side effects were discussed.  We discussed the fact that there are potential issues with nearly all medications in pregnancy.  These include but are not limited to birth defects, pregnancy loss, prematurity, infant death, or developmental disabilities.     Plans for rectovaginal group B strep culture at next evaluation discussed.    Additional over-the-counter medicines including liquid based were discussed.    Signs and symptoms of " labor and indications to present to labor and delivery discussed.    We discussed issues associated with labor and delivery and the general delivery plan.    We discussed the available methods of pain control during the labor process including IV medications, epidural and other, as well as spinal anesthesia with  sections.  We discussed that unfortunately, some discomfort and pain is part of the labor and delivery process.    We discussed that our goal is ultimately a healthy baby.  This may occur via a natural vaginal delivery or by operative vaginal delivery or  section.      We discussed the fact that vaginal deliveries can be complicated by pain, maternal exhaustion, tears, trauma and lacerations of the pelvis, hemorrhoids, GI issues, urinary issues including incontinence and other significant maternal and fetal issues.    We discussed the fact that operative vaginal delivery such as the vacuum or forceps as well as  section are always a possibility.    The pros, cons, risks, benefits, alternatives, and indications of the obstetrical surgical procedures were discussed in with the patient.  We discussed the possibility of allergic reactions, bleeding, infection damage to cervix, uterus, bladder, ureters, bowel, and other abdominal pelvic organs.  We discussed the fact that potential fetal issues including the need for prolonged evaluation in the and I see you as well as fetal injury are always a possibility.    The patient's questions regarding above were answered.  The patient [facility] and given times reviewed the form.  Questions were answered and consent was obtained     The patient's questions regarding the above were answered and she is in agreement with the current plan.     OB PROBLEM LIST:  Depression:  Currently under care of Psychiatry ([phone]) and on Lexapro 20 mg, Klonopin 0.5 mg PRN & Lamictal 25 mg (increased at this time  secondary to pregnancy and depression over loss of 1st child.)  Syncopal episode: seen by Cardiology  History of HSV: Will initiate prophylaxis at 36 weeks  LGSIL on Pap smear     FINAL EDC:    3/19/2024 by US done 9/8/2023 @ 12-2/7 weeks      SO Name: Favian Cosby ANATOMY SCAN:    SIZE = DATES (EFW = 419 gms at 13% and AC 11%)  ANATOMY: No fetal structural abnormalities appreciated but incomplete fetal anatomical survey  PLACENTA: Posterior placenta and placental edge to cervix appears wnl per transabdominal ultrasound but suboptimal visualization  OTHER: Cervical length screen via transabdominal ultrasound wnl.    INITIAL LABS:    MBT: A positive  AB SCREEN: negative  RPR: negative  RUBELLA: Immune  HEPATITIS A/B/C: negative  HIV: negative  CBC: DATE: 11/8/2023         7.5 >--- 11.5 / 32.4 ---< 215  HGB: normal  URINE CX: negative  Other:      10-12 WEEK LABS:    PAP: low-grade squamous intraepithelial neoplasia (LGSIL - encompassing HPV,mild dysplasia,RENA I) / Date: 7/10/2023    ALLA/CHLAM: negative x 2    cfDNA ([id number]):  Negative per patient    CARRIER SCREEN (Inheritest Core):  Patient reports previous testing for negative carrier status   28 WEEK LABS:    12/19/2023  Ferritin: 5  1 hour OGTT: 109  CBC noted: 7 > 10 / 28.7 < 204       OTHER LABS:     OTHER ULTRASOUNDS:    01/26/2024:  Preliminary results of ultrasound and a single intrauterine pregnancy at 30-1/7 weeks, 3 lb 5 oz with inappropriate AMY. TO-DO THROUGHOUT PREGNANCY:    TDAP (27-36wks): 1/2/2024    Plans for epidural: 2/15/2024    Consent for delivery: 2/15/2024   MEDICATIONS:    Prenatal vitamins  Promethazine 25 mg tablet   Lexapro 20 mg  Klonopin 0.5 mg PRN  Lamictal 25 mg  ALLERGIES:    Clindamycin: Shortness of breath and hives    MEDICAL HISTORY:    Depression, anxiety and PTSD.  Pre-pregnancy BMI = 19.3 SURGICAL HISTORY:    Negative    SOCIAL HISTORY:    Smoker:  Positive for vaping  Alcohol: denies  Drugs: denies  Relationship:  co-habitating  Domestic Violence: no  Lives with:  FOB and four children  Education Level: Some College  Occupation: homemaker  Mosque:  Jehovah's witness  Other:   GYN HISTORY:    PAP'S: no h/o abnormals  STI'S: no past history  GENITAL HSV:  The patient reports previous positive testing but no reported outbreaks.  She was placed on HSV prophylaxis with previous pregnancies FAMILY HISTORY:    HTN: Yes - mother  DIABETES: Yes - mother and sibling  BLEEDING D/O: No  CLOTTING D/O: No  BIRTH DEFECTS: No  MENTAL DISABILITY: No  TWINS OR MORE: No  GYN CANCER: No  Other:     OBSTETRIC HISTORY   Month/Year Mode of Delivery EGA Wt. M/F Complications / Comments   2015    Male Now    2018    Male    2019    Female                      Plan:      35 weeks gestation of pregnancy    HSV (herpes simplex virus) infection  -     valACYclovir (VALTREX) 500 MG tablet; Take 1 tablet (500 mg total) by mouth 2 (two) times daily.  Dispense: 60 tablet; Refill: 11    Gastroesophageal reflux disease without esophagitis    Uterine size-date discrepancy in third trimester  -     US OB/GYN Procedure ([facility]) - Extended List; Standing      Follow up in about 1 week (around 2024) for Routine OB F/U or as needed.     Pilo Peng MD  Department OBGYN  [facility]

## 2024-02-16 NOTE — PROGRESS NOTES
Make sure this patient get set up for twice weekly antepartum testing (NST and ultrasound BPP)  make sure she sees a physician once a week.  As you may know results of your recent ultrasound note the baby to be on the small range of growth.  We are going to start keeping track of you in the baby twice weekly.  We will further discuss at your next evaluation, next week but if you have any questions or concerns please let me know.      Pilo Peng MD  OBGYN [facility]

## 2024-02-19 ENCOUNTER — CLINICAL SUPPORT (OUTPATIENT)
Dept: OBSTETRICS AND GYNECOLOGY | Facility: CLINIC | Age: 29
End: 2024-02-19
Payer: MEDICAID

## 2024-02-19 VITALS
DIASTOLIC BLOOD PRESSURE: 84 MMHG | SYSTOLIC BLOOD PRESSURE: 116 MMHG | HEIGHT: 61 IN | BODY MASS INDEX: 23.25 KG/M2 | WEIGHT: 123.13 LBS

## 2024-02-19 DIAGNOSIS — O36.5930 INTRAUTERINE GROWTH RESTRICTION (IUGR) AFFECTING CARE OF MOTHER, THIRD TRIMESTER, SINGLE GESTATION: Primary | ICD-10-CM

## 2024-02-19 PROCEDURE — 59025 FETAL NON-STRESS TEST: CPT | Mod: PBBFAC,PO | Performed by: OBSTETRICS & GYNECOLOGY

## 2024-02-19 PROCEDURE — 99999 PR PBB SHADOW E&M-EST. PATIENT-LVL II: CPT | Mod: PBBFAC,,,

## 2024-02-19 PROCEDURE — 99212 OFFICE O/P EST SF 10 MIN: CPT | Mod: PBBFAC,TH,PO,25

## 2024-02-19 PROCEDURE — 59025 FETAL NON-STRESS TEST: CPT | Mod: 26,S$PBB,, | Performed by: OBSTETRICS & GYNECOLOGY

## 2024-02-19 NOTE — PROGRESS NOTES
"Nonstress Test Note    PATIENT NAME: Josefa Oliver    MRN: [id number]  TODAY'S DATE: 2024    Josefa Oliver is a 28 y.o.   who presents today at 35w6d for antepartum testing.   Estimated Date of Delivery: 3/19/24.     Indication for NST:  Intrauterine growth restriction     Vitals:    24 1453   BP: 116/84   BP Location: Right arm   Patient Position: Sitting   BP Method: Medium (Manual)   Weight: 55.8 kg (123 lb 2 oz)   Height: 5' 1" (1.549 m)         Fetal activity:  Patient reports positive fetal movement     FHR baseline: 130's bpm  Variability:  Moderate   Accelerations:  Present   Decelerations:  None      No uterine contractions on toco.       NST:  REACTIVE    Pilo Peng MD  Department OBGYN [facility]    OBSTETRICAL ULTRASOUND FROM 02/15/2024    Indication  ========  Indication: Assess Fetal Growth    History  ======  Previous Outcomes   4  Para 3    Current Pregnancy  ==============  No fetal aneuploidy screening done    Fetal Growth Overview  =================  Exam date        GA              BPD (mm)         HC (mm)        AC (mm)        FL (mm)         HL (mm)        EFW (g)  2023        22w 3d         50.7                 189.6             161.4             36.4               35.9              419    13%  2024        31w 3d        74                    277.8              259.4             56.9                                   1495    15%  02/15/2024        35w 5d        84                    302.4              296.9             64.7                                   2231    8%    Pregnancy  =========  Alexandre pregnancy. Number of fetuses: 1    Dating  ======  Previous Ultrasound on: 2023  Type of prior assessment: CRL  U/S measurement at prior assessment date 64.0 mm  GA by previous U/S 35 w + 5 d  ROMAN by previous Ultrasound: 3/16/2024  Ultrasound examination on: 2/15/2024  GA by U/S based upon: AC, BPD, Femur, HC  GA by U/S 33 w + 5 d  ROMAN by U/S: " 3/30/2024  Assigned: based on ultrasound (CRL), selected on 11/14/2023  Assigned GA 35 w + 5 d  Assigned ROMAN: 3/16/2024    General Evaluation  ==============  Cardiac activity present.  bpm. Fetal movements: visualized. Presentation: cephalic  Placenta: Placental site: posterior  Umbilical cord: Cord vessels: 3 vessel cord  Amniotic fluid: Amount of AF: normal amount. MVP 4.9 cm    Fetal Biometry  ============  Standard  BPD 84.0 mm 33w 6d Hadlock  .0 mm 35w 0d Eric  .4 mm 33w 4d Hadlock  .9 mm 33w 5d Hadlock  Femur 64.7 mm 33w 3d Hadlock  HC / AC 1.02  EFW 2,231 g 8% Taiwo  EFW (lb) 4 lb  EFW (oz) 15 oz  EFW by: Hadlock (BPD-HC-AC-FL)  Head / Face / Neck  Cephalic index 0.79  Extremities / Bony Struc  FL / BPD 0.77  FL / HC 0.21  FL / AC 0.22  Other Structures   bpm    Fetal Anatomy  ===========  4-chamber view: normal  RVOT view: normal  3-vessel-trachea view: suboptimal  Cord insertion: suboptimal  Stomach: normal  Kidneys: normal  Bladder: normal  Fetal sex: female  Wants to know fetal sex: yes    Biophysical Profile  ==============  0: Fetal breathing movements  2: Gross body movements  2: Fetal tone  2: Amniotic fluid volume  6/8 Biophysical profile score    Fetal Doppler  ===========  Arterial  Umbilical A PI 1.01 81% Pancho  Umbilical A RI 0.64 78% Pancho  Umbilical A PS 44.05 cm/s  Umbilical A ED 15.78 cm/s  Umbilical A TAmax 28.01 cm/s  Umbilical A MD 15.64 cm/s  Umbilical A S / D 2.79 71% Pancho  Umbilical A  bpm    Impression  =========  FGR is identified on today's study. The EFW plots at the 8%, and the AC plots at the 9%.  The EFW is 2231 g.  AFV is normal, and the BPP score is 6/8.  Umbilical artery Doppler S/D ratios are normal.    Recommendation  ==============  1. Manage according to [facility] FGR guidelines  2. If assistance desired with managment, consultation with MFM is available  3. NST today due to BPP score of 6/8 --- the sonographer aware  of recommendation and notified the MD

## 2024-02-22 ENCOUNTER — HOSPITAL ENCOUNTER (OUTPATIENT)
Dept: OBSTETRICS AND GYNECOLOGY | Facility: CLINIC | Age: 29
Discharge: HOME | End: 2024-02-22
Attending: GENERAL PRACTICE
Payer: MEDICAID

## 2024-02-22 ENCOUNTER — ROUTINE PRENATAL (OUTPATIENT)
Dept: OBSTETRICS AND GYNECOLOGY | Facility: CLINIC | Age: 29
End: 2024-02-22
Payer: MEDICAID

## 2024-02-22 VITALS
BODY MASS INDEX: 23.66 KG/M2 | DIASTOLIC BLOOD PRESSURE: 78 MMHG | WEIGHT: 125.25 LBS | SYSTOLIC BLOOD PRESSURE: 130 MMHG

## 2024-02-22 DIAGNOSIS — Z3A.36 36 WEEKS GESTATION OF PREGNANCY: Primary | ICD-10-CM

## 2024-02-22 DIAGNOSIS — O36.5990 FETAL GROWTH RESTRICTION ANTEPARTUM: ICD-10-CM

## 2024-02-22 DIAGNOSIS — B00.9 HSV (HERPES SIMPLEX VIRUS) INFECTION: ICD-10-CM

## 2024-02-22 DIAGNOSIS — O09.90 SUPERVISION OF HIGH RISK PREGNANCY, ANTEPARTUM: ICD-10-CM

## 2024-02-22 PROCEDURE — 87081 CULTURE SCREEN ONLY: CPT | Performed by: OBSTETRICS & GYNECOLOGY

## 2024-02-22 PROCEDURE — 99999 PR PBB SHADOW E&M-EST. PATIENT-LVL II: CPT | Mod: PBBFAC,,, | Performed by: OBSTETRICS & GYNECOLOGY

## 2024-02-22 PROCEDURE — 76820 UMBILICAL ARTERY ECHO: CPT | Mod: 26,,, | Performed by: OBSTETRICS & GYNECOLOGY

## 2024-02-22 PROCEDURE — 99212 OFFICE O/P EST SF 10 MIN: CPT | Mod: PBBFAC,TH,PO | Performed by: OBSTETRICS & GYNECOLOGY

## 2024-02-22 PROCEDURE — 76819 FETAL BIOPHYS PROFIL W/O NST: CPT | Mod: 26,,, | Performed by: OBSTETRICS & GYNECOLOGY

## 2024-02-22 PROCEDURE — 59426 ANTEPARTUM CARE ONLY: CPT | Mod: TH,S$PBB,, | Performed by: OBSTETRICS & GYNECOLOGY

## 2024-02-22 RX ORDER — VALACYCLOVIR HYDROCHLORIDE 500 MG/1
500 TABLET, FILM COATED ORAL 2 TIMES DAILY
Qty: 60 TABLET | Refills: 11 | Status: SHIPPED | OUTPATIENT
Start: 2024-02-22 | End: 2024-02-28

## 2024-02-22 NOTE — PROGRESS NOTES
"    Prenatal Note   Chief Complaint:  Routine Prenatal Visit       Patient ID: Josefa Oliver is a  28 y.o. female.    Date: 2024    TODAY'S PROGRESS NOTE    S/ 28 y.o. y.o.  at 36-2/7 weeks who presents for routine prenatal evaluation.      Occasional [place] Brice and recent issues with "tailbone pain".    No vaginal bleeding.  No ruptured membranes.    Good fetal movement.    Depression is currently stable.    O/  VITALS:  Weight:  125 lb  BP:  130/78    FH: 32 cm  DT'S: 140's bpm by doppler    Rectovaginal group B strep culture obtained.    Cervix is 1 cm dilated, anterior, cephalic and 75% effaced.  No vulvar abnormality/lesions noted.    A/P  Intrauterine pregnancy at 32-2/7 weeks  Intrauterine growth restriction  Reflux  Depression  ?  HSV history    The above was reviewed and discussed with the patient.    The patient is scheduled for her ultrasound biophysical profile later today.  In the event that antepartum testing remains reassuring we discussed plans for induction of labor between 38 and 39 weeks.  The patient will discuss scheduling with family and notify us when she has made a decision as to when she is comfortable scheduling induction of labor if continued antepartum monitoring is reassuring.    We discussed the pros cons risks benefits of HSV prophylaxis with Valtrex in pregnancy.  While the patient is unsure as to whether she actually has HSV will proceed with prophylaxis via Valtrex 500 mg q.day.  The pros, cons, risks, benefits, alternatives and indications of the medication(s) prescribed, as well as appropriate use and potential side effects were discussed.  We discussed the fact that there are potential issues with nearly all medications in pregnancy.  These include but are not limited to birth defects, pregnancy loss, prematurity, infant death, or developmental disabilities.     Signs and symptoms of labor and indications to present to labor and delivery discussed.  The " importance of monitoring kick counts reviewed and discussed    The patient's questions regarding the above were answered and she is in agreement with the current plan.     OB PROBLEM LIST:  Depression:  Currently under care of Psychiatry ([phone]) and on Lexapro 20 mg, Klonopin 0.5 mg PRN & Lamictal 25 mg (increased at this time secondary to pregnancy and depression over loss of 1st child.)  Syncopal episode: seen by Cardiology  History of HSV:  Valtrex 500 mg b.i.d. prophylaxis initiated 02/22/2024  LGSIL on Pap smear  Intrauterine growth restriction  [ ] Twice weekly antepartum testing  [ ] Plan induction of labor 38-39 weeks     FINAL EDC:    3/19/2024 by US done 9/8/2023 @ 12-2/7 weeks      SO Name: Favian Cosby ANATOMY SCAN:    SIZE = DATES (EFW = 419 gms at 13% and AC 11%)  ANATOMY: No fetal structural abnormalities appreciated but incomplete fetal anatomical survey  PLACENTA: Posterior placenta and placental edge to cervix appears wnl per transabdominal ultrasound but suboptimal visualization  OTHER: Cervical length screen via transabdominal ultrasound wnl.    INITIAL LABS:    MBT: A positive  AB SCREEN: negative  RPR: negative  RUBELLA: Immune  HEPATITIS A/B/C: negative  HIV: negative  CBC: DATE: 11/8/2023         7.5 >--- 11.5 / 32.4 ---< 215  HGB: normal  URINE CX: negative  Other:      10-12 WEEK LABS:    PAP: low-grade squamous intraepithelial neoplasia (LGSIL - encompassing HPV,mild dysplasia,RENA I) / Date: 7/10/2023    ALLA/CHLAM: negative x 2    cfDNA ([id number]):  Negative per patient    CARRIER SCREEN (Inheritest Core):  Patient reports previous testing for negative carrier status   28 WEEK LABS:    12/19/2023  Ferritin: 5  1 hour OGTT: 109  CBC noted: 7 > 10 / 28.7 < 204       OTHER LABS:     OTHER ULTRASOUNDS:    01/26/2024:  Preliminary results of ultrasound and a single intrauterine pregnancy at 30-1/7 weeks, 3 lb 5 oz with inappropriate AMY. TO-DO THROUGHOUT PREGNANCY:    TDAP  (27-36wks): 2024    Plans for epidural: 2/15/2024    Consent for delivery: 2/15/2024   MEDICATIONS:    Prenatal vitamins  Promethazine 25 mg tablet   Lexapro 20 mg  Klonopin 0.5 mg PRN  Lamictal 25 mg  ALLERGIES:    Clindamycin: Shortness of breath and hives    MEDICAL HISTORY:    Depression, anxiety and PTSD.  Pre-pregnancy BMI = 19.3 SURGICAL HISTORY:    Negative    SOCIAL HISTORY:    Smoker:  Positive for vaping  Alcohol: denies  Drugs: denies  Relationship: co-habitating  Domestic Violence: no  Lives with:  FOB and four children  Education Level: Some College  Occupation: homemaker  Episcopal:  Taoist  Other:   GYN HISTORY:    PAP'S: no h/o abnormals  STI'S: no past history  GENITAL HSV:  The patient reports previous positive testing but no reported outbreaks.  She was placed on HSV prophylaxis with previous pregnancies FAMILY HISTORY:    HTN: Yes - mother  DIABETES: Yes - mother and sibling  BLEEDING D/O: No  CLOTTING D/O: No  BIRTH DEFECTS: No  MENTAL DISABILITY: No  TWINS OR MORE: No  GYN CANCER: No  Other:     OBSTETRIC HISTORY   Month/Year Mode of Delivery EGA Wt. M/F Complications / Comments   2015    Male Now    2018    Male    2019    Female                      Plan:      36 weeks gestation of pregnancy  -     Strep B Screen, Vaginal / Rectal    Fetal growth restriction antepartum    Supervision of high risk pregnancy, antepartum    HSV (herpes simplex virus) infection  -     valACYclovir (VALTREX) 500 MG tablet; Take 1 tablet (500 mg total) by mouth 2 (two) times daily.  Dispense: 60 tablet; Refill: 11      Follow up One week with twice weekly antepartum testing, for Routine OB F/U or as needed.     Pilo Peng MD  Department OBGYN  [facility]

## 2024-02-23 ENCOUNTER — PATIENT MESSAGE (OUTPATIENT)
Dept: OBSTETRICS AND GYNECOLOGY | Facility: CLINIC | Age: 29
End: 2024-02-23
Payer: MEDICAID

## 2024-02-26 ENCOUNTER — HOSPITAL ENCOUNTER (OUTPATIENT)
Facility: HOSPITAL | Age: 29
Discharge: HOME | End: 2024-02-26
Attending: OBSTETRICS & GYNECOLOGY | Admitting: OBSTETRICS & GYNECOLOGY
Payer: MEDICAID

## 2024-02-26 ENCOUNTER — PATIENT MESSAGE (OUTPATIENT)
Dept: OBSTETRICS AND GYNECOLOGY | Facility: CLINIC | Age: 29
End: 2024-02-26

## 2024-02-26 ENCOUNTER — CLINICAL SUPPORT (OUTPATIENT)
Dept: OBSTETRICS AND GYNECOLOGY | Facility: CLINIC | Age: 29
End: 2024-02-26
Payer: MEDICAID

## 2024-02-26 VITALS
DIASTOLIC BLOOD PRESSURE: 61 MMHG | OXYGEN SATURATION: 99 % | RESPIRATION RATE: 18 BRPM | SYSTOLIC BLOOD PRESSURE: 108 MMHG | TEMPERATURE: 98 F | HEART RATE: 75 BPM

## 2024-02-26 DIAGNOSIS — Z3A.36 36 WEEKS GESTATION OF PREGNANCY: ICD-10-CM

## 2024-02-26 DIAGNOSIS — Z3A.38 38 WEEKS GESTATION OF PREGNANCY: Primary | ICD-10-CM

## 2024-02-26 DIAGNOSIS — O36.5990 FETAL GROWTH RESTRICTION ANTEPARTUM: Primary | ICD-10-CM

## 2024-02-26 DIAGNOSIS — O36.5990 IUGR (INTRAUTERINE GROWTH RESTRICTION) AFFECTING CARE OF MOTHER: Primary | ICD-10-CM

## 2024-02-26 DIAGNOSIS — O36.5990 IUGR (INTRAUTERINE GROWTH RESTRICTION) AFFECTING CARE OF MOTHER: ICD-10-CM

## 2024-02-26 DIAGNOSIS — O36.5931 IUGR (INTRAUTERINE GROWTH RESTRICTION) AFFECTING CARE OF MOTHER, THIRD TRIMESTER, FETUS 1: ICD-10-CM

## 2024-02-26 LAB
BACTERIA #/AREA URNS HPF: ABNORMAL /HPF
BACTERIA SPEC AEROBE CULT: NORMAL
BILIRUB UR QL STRIP: NEGATIVE
CLARITY UR: ABNORMAL
COLOR UR: YELLOW
GLUCOSE UR QL STRIP: NEGATIVE
HGB UR QL STRIP: NEGATIVE
KETONES UR QL STRIP: NEGATIVE
LEUKOCYTE ESTERASE UR QL STRIP: ABNORMAL
MICROSCOPIC COMMENT: ABNORMAL
NITRITE UR QL STRIP: NEGATIVE
PH UR STRIP: 7 [PH] (ref 5–8)
PROT UR QL STRIP: ABNORMAL
RBC #/AREA URNS HPF: 2 /HPF (ref 0–4)
SP GR UR STRIP: 1.02 (ref 1–1.03)
SQUAMOUS #/AREA URNS HPF: 12 /HPF
URN SPEC COLLECT METH UR: ABNORMAL
UROBILINOGEN UR STRIP-ACNC: NEGATIVE EU/DL
WBC #/AREA URNS HPF: 2 /HPF (ref 0–5)
YEAST URNS QL MICRO: ABNORMAL

## 2024-02-26 PROCEDURE — 99212 OFFICE O/P EST SF 10 MIN: CPT | Mod: PBBFAC,TH,PO

## 2024-02-26 PROCEDURE — 59025 FETAL NON-STRESS TEST: CPT | Mod: PBBFAC,PO | Performed by: OBSTETRICS & GYNECOLOGY

## 2024-02-26 PROCEDURE — 81001 URINALYSIS AUTO W/SCOPE: CPT | Performed by: OBSTETRICS & GYNECOLOGY

## 2024-02-26 PROCEDURE — 99999 PR PBB SHADOW E&M-EST. PATIENT-LVL II: CPT | Mod: PBBFAC,,,

## 2024-02-26 PROCEDURE — 59025 FETAL NON-STRESS TEST: CPT | Mod: 26,S$PBB,, | Performed by: OBSTETRICS & GYNECOLOGY

## 2024-02-26 PROCEDURE — 99211 OFF/OP EST MAY X REQ PHY/QHP: CPT

## 2024-02-26 RX ORDER — OXYTOCIN/RINGER'S LACTATE 30/500 ML
95 PLASTIC BAG, INJECTION (ML) INTRAVENOUS ONCE
Status: CANCELLED | OUTPATIENT
Start: 2024-02-26 | End: 2024-02-26

## 2024-02-26 RX ORDER — OXYTOCIN/RINGER'S LACTATE 30/500 ML
334 PLASTIC BAG, INJECTION (ML) INTRAVENOUS ONCE AS NEEDED
Status: CANCELLED | OUTPATIENT
Start: 2024-02-26 | End: 2035-07-25

## 2024-02-26 RX ORDER — ONDANSETRON 8 MG/1
8 TABLET, ORALLY DISINTEGRATING ORAL EVERY 8 HOURS PRN
Status: CANCELLED | OUTPATIENT
Start: 2024-02-26

## 2024-02-26 RX ORDER — SIMETHICONE 80 MG
1 TABLET,CHEWABLE ORAL 4 TIMES DAILY PRN
Status: CANCELLED | OUTPATIENT
Start: 2024-02-26

## 2024-02-26 RX ORDER — SODIUM CHLORIDE, SODIUM LACTATE, POTASSIUM CHLORIDE, CALCIUM CHLORIDE 600; 310; 30; 20 MG/100ML; MG/100ML; MG/100ML; MG/100ML
INJECTION, SOLUTION INTRAVENOUS CONTINUOUS
Status: CANCELLED | OUTPATIENT
Start: 2024-02-26

## 2024-02-26 RX ORDER — OXYTOCIN/RINGER'S LACTATE 30/500 ML
334 PLASTIC BAG, INJECTION (ML) INTRAVENOUS ONCE
Status: CANCELLED | OUTPATIENT
Start: 2024-02-26 | End: 2024-02-26

## 2024-02-26 RX ORDER — MISOPROSTOL 200 UG/1
800 TABLET ORAL ONCE AS NEEDED
Status: CANCELLED | OUTPATIENT
Start: 2024-02-26

## 2024-02-26 RX ORDER — CALCIUM CARBONATE 200(500)MG
500 TABLET,CHEWABLE ORAL 3 TIMES DAILY PRN
Status: CANCELLED | OUTPATIENT
Start: 2024-02-26

## 2024-02-26 RX ORDER — LIDOCAINE HYDROCHLORIDE 10 MG/ML
10 INJECTION INFILTRATION; PERINEURAL ONCE AS NEEDED
Status: CANCELLED | OUTPATIENT
Start: 2024-02-26 | End: 2035-07-25

## 2024-02-26 RX ORDER — METHYLERGONOVINE MALEATE 0.2 MG/ML
200 INJECTION INTRAVENOUS ONCE AS NEEDED
Status: CANCELLED | OUTPATIENT
Start: 2024-02-26 | End: 2035-07-25

## 2024-02-26 RX ORDER — OXYTOCIN/RINGER'S LACTATE 30/500 ML
95 PLASTIC BAG, INJECTION (ML) INTRAVENOUS ONCE AS NEEDED
Status: CANCELLED | OUTPATIENT
Start: 2024-02-26 | End: 2035-07-25

## 2024-02-26 RX ORDER — MISOPROSTOL 200 UG/1
800 TABLET ORAL ONCE AS NEEDED
Status: CANCELLED | OUTPATIENT
Start: 2024-02-26 | End: 2035-07-25

## 2024-02-26 RX ORDER — CARBOPROST TROMETHAMINE 250 UG/ML
250 INJECTION, SOLUTION INTRAMUSCULAR
Status: CANCELLED | OUTPATIENT
Start: 2024-02-26

## 2024-02-26 RX ORDER — DIPHENOXYLATE HYDROCHLORIDE AND ATROPINE SULFATE 2.5; .025 MG/1; MG/1
2 TABLET ORAL EVERY 6 HOURS PRN
Status: CANCELLED | OUTPATIENT
Start: 2024-02-26

## 2024-02-26 RX ORDER — MUPIROCIN 20 MG/G
OINTMENT TOPICAL
Status: CANCELLED | OUTPATIENT
Start: 2024-02-26

## 2024-02-26 RX ORDER — OXYTOCIN 10 [USP'U]/ML
10 INJECTION, SOLUTION INTRAMUSCULAR; INTRAVENOUS ONCE AS NEEDED
Status: CANCELLED | OUTPATIENT
Start: 2024-02-26 | End: 2035-07-25

## 2024-02-26 NOTE — PROGRESS NOTES
Nonstress Test Note    PATIENT NAME: Josefa Oliver    MRN: [id number]  TODAY'S DATE: 2024  ADMIT DATE: (Not on file)    Josefa Oliver is a 28 y.o.   who presents today at 36w6d for antepartum testing.   Estimated Date of Delivery: 3/19/24.     Indication for NST:  Intrauterine growth restriction     Vitals:    24 1445   BP: (P) 108/76   BP Location: (P) Right arm   Patient Position: (P) Sitting   BP Method: (P) Medium (Manual)   Weight: (P) 55.7 kg (122 lb 14.5 oz)         Fetal activity:  Patient reports positive fetal movement.  She does report some issues with nausea yesterday and occasional [place] Brice type contractions.  Plans to proceed with induction of labor at 38 weeks if continued reassuring antepartum testing is noted.  Signs and symptoms of labor and indications to present to labor and delivery discussed for     FHR baseline: 120's bpm  Variability:  Moderate   Accelerations:  Present   Decelerations:  None      No significant uterine contractions on toco.      NST:  REACTIVE    Pilo Peng MD  Department OBGYN [facility]

## 2024-02-27 NOTE — NURSING
[facility]  Department of Obstetrics and Gynecology  Labor & Delivery Triage Assessment    PATIENT NAME: Josefa Oliver  MRN: [id number]  TODAY'S DATE: 2024    CHIEF COMPLAINT: No chief complaint on file.      OB History    Para Term  AB Living   4 3 3 0 0 2   SAB IAB Ectopic Multiple Live Births   0 0 0 0 3      # Outcome Date GA Lbr Laurent/2nd Weight Sex Delivery Anes PTL Lv   4 Current            3 Term 19    F Vag-Spont   NARENDRA   2 Term 18    M Vag-Spont   NARENDRA   1 Term 11/16/15    M Vag-Spont   DEC      Obstetric Comments   Vaginal delivery x 3     Past Medical History:   Diagnosis Date    Abnormal Pap smear of cervix     Anxiety disorder, unspecified     Depression     Fall (on) (from) other stairs and steps, initial encounter 2023    Mental disorder      History reviewed. No pertinent surgical history.      VITAL SIGNS - ABNORMAL VITALS INCLUDE TEMP >100.4,RR <12 or >26, SUSTAINED MATERNAL PULSE <60 or >120     VITAL SIGNS (Most Recent)  Temp: 98.2 °F (36.8 °C) (24 161)  Pulse: 75 (24)  Resp: 18 (24)  BP: 108/61 (24)  SpO2: 99 % (24)    VITAL SIGNS     normal  HEADACHE    no     VOMITING    no  VISUAL DISTURBANCES  no  EPIGASTRIC PAIN        no  PROTEINURIA 2+ or MORE             no   EDEMA FACE/EXTREMITIES            no    FETAL MOVEMENT     FETAL MOVEMENT: Active  FETAL HEART RATE BASELINE =  120  normal  FETAL HEART RATE VARIABILITY:  Moderate  FETAL HEART RATE ACCELERATIONS FOR GESTATIONAL AGE: present  FETAL HEART RATE DECELERATIONS: none    ABDOMINAL PAIN/CRAMPING/CONTRACTIONS     Patient is not complaining of abdominal pain/cramping/contractions.    RUPTURE OF MEMBRANES OR LEAKING OF AMNIOTIC FLUID     Patient denies ROM or leaking of amniotic fluid.    VAGINAL BLEEDING     Patient denies vaginal bleeding.    VAGINAL EXAM     DILATION:    STATION:    EFFACEMENT:    PRESENTATION:      VAGINAL EXAM DEFERRED DUE  TO:  Not in Labor    PAIN PRESENT ON ARRIVAL     ONSET:   constant  LOCATION:  Tailbone/low back  PAIN SCALE (0-10):  6/10  DESCRIPTION: Constant deep pain    Interventions     Oral fluids given and tolerated.      PATIENT DISPOSITION     Discharged Home      Dr. Peng notified at 1755 of the above assessment. Orders to discharge home received.    Blanca Kraus RN  [facility]  02/26/2024

## 2024-02-28 ENCOUNTER — TELEPHONE (OUTPATIENT)
Dept: OBSTETRICS AND GYNECOLOGY | Facility: CLINIC | Age: 29
End: 2024-02-28
Payer: MEDICAID

## 2024-02-28 ENCOUNTER — PATIENT MESSAGE (OUTPATIENT)
Dept: OBSTETRICS AND GYNECOLOGY | Facility: CLINIC | Age: 29
End: 2024-02-28
Payer: MEDICAID

## 2024-02-28 DIAGNOSIS — B00.9 HSV (HERPES SIMPLEX VIRUS) INFECTION: Primary | ICD-10-CM

## 2024-02-28 RX ORDER — ACYCLOVIR 400 MG/1
400 TABLET ORAL 2 TIMES DAILY
Qty: 60 TABLET | Refills: 0 | Status: ON HOLD | OUTPATIENT
Start: 2024-02-28 | End: 2024-03-04 | Stop reason: HOSPADM

## 2024-02-28 NOTE — TELEPHONE ENCOUNTER
For HSV prophylaxis at end of pregnancy patient was initially given prescription for Valtrex.  Apparently there is a Medicaid issue and new prescription for acyclovir has been sent to pharmacy.

## 2024-02-29 ENCOUNTER — ROUTINE PRENATAL (OUTPATIENT)
Dept: OBSTETRICS AND GYNECOLOGY | Facility: CLINIC | Age: 29
End: 2024-02-29
Payer: MEDICAID

## 2024-02-29 ENCOUNTER — HOSPITAL ENCOUNTER (OUTPATIENT)
Dept: OBSTETRICS AND GYNECOLOGY | Facility: CLINIC | Age: 29
Discharge: HOME | End: 2024-02-29
Attending: GENERAL PRACTICE
Payer: MEDICAID

## 2024-02-29 VITALS
WEIGHT: 122.56 LBS | SYSTOLIC BLOOD PRESSURE: 100 MMHG | BODY MASS INDEX: 23.16 KG/M2 | DIASTOLIC BLOOD PRESSURE: 70 MMHG

## 2024-02-29 DIAGNOSIS — O36.5990 FETAL GROWTH RESTRICTION ANTEPARTUM: ICD-10-CM

## 2024-02-29 DIAGNOSIS — O36.5931 POOR FETAL GROWTH AFFECTING MANAGEMENT OF MOTHER IN THIRD TRIMESTER, FETUS 1 OF MULTIPLE GESTATION: Primary | ICD-10-CM

## 2024-02-29 DIAGNOSIS — Z3A.37 37 WEEKS GESTATION OF PREGNANCY: ICD-10-CM

## 2024-02-29 DIAGNOSIS — B00.9 HSV (HERPES SIMPLEX VIRUS) INFECTION: ICD-10-CM

## 2024-02-29 DIAGNOSIS — O09.90 SUPERVISION OF HIGH RISK PREGNANCY, ANTEPARTUM: ICD-10-CM

## 2024-02-29 PROCEDURE — 99999 PR PBB SHADOW E&M-EST. PATIENT-LVL II: CPT | Mod: PBBFAC,,, | Performed by: OBSTETRICS & GYNECOLOGY

## 2024-02-29 PROCEDURE — 59025 FETAL NON-STRESS TEST: CPT | Mod: 26,S$PBB,, | Performed by: OBSTETRICS & GYNECOLOGY

## 2024-02-29 PROCEDURE — 99212 OFFICE O/P EST SF 10 MIN: CPT | Mod: PBBFAC,TH,PO,25 | Performed by: OBSTETRICS & GYNECOLOGY

## 2024-02-29 PROCEDURE — 76819 FETAL BIOPHYS PROFIL W/O NST: CPT | Mod: 26,,, | Performed by: OBSTETRICS & GYNECOLOGY

## 2024-02-29 PROCEDURE — 99214 OFFICE O/P EST MOD 30 MIN: CPT | Mod: 25,TH,S$PBB, | Performed by: OBSTETRICS & GYNECOLOGY

## 2024-02-29 PROCEDURE — 76820 UMBILICAL ARTERY ECHO: CPT | Mod: 26,,, | Performed by: OBSTETRICS & GYNECOLOGY

## 2024-02-29 PROCEDURE — 59025 FETAL NON-STRESS TEST: CPT | Mod: PBBFAC,PO | Performed by: OBSTETRICS & GYNECOLOGY

## 2024-02-29 NOTE — PROGRESS NOTES
Prenatal Note   Chief Complaint:  Routine Prenatal Visit       Patient ID: Josefa Oliver is a  28 y.o. female.    Date: 2024    TODAY'S PROGRESS NOTE    S/ 28 y.o. y.o.  at 37-2/7 weeks who presents for routine prenatal evaluation.      Occasional [place] Brice contractions  No vaginal bleeding.  No ruptured membranes.    Good fetal movement.    Depression is currently stable.  No HSV issues    Following her last evaluation the patient did note some increased issues with nausea and vomiting and presented to labor and delivery.    O/  VITALS:  Weight:  123 lb  BP:  Blood pressure 100/70    Antepartum testing as below:  Biophysical profile 8/10      A/P  Intrauterine pregnancy at 37-27 weeks  Intrauterine growth restriction  Reflux  Depression  ?  HSV history    The above was reviewed and discussed with the patient.    We discussed her recent issues and the need for appropriate fluid hydration.  We discussed her issues with depression anxiety which continue but have not worsened at this time.    We discussed today's reassuring antepartum testing.  We discussed the plan for induction of labor at 38 weeks as scheduled.      Continued kick counts.  Signs and symptoms of labor reviewed.    The patient's questions regarding the above were answered and she is in agreement with the current plan.     OB PROBLEM LIST:  Depression:  Currently under care of Psychiatry ([phone]) and on Lexapro 20 mg, Klonopin 0.5 mg PRN & Lamictal 25 mg (increased at this time secondary to pregnancy and depression over loss of 1st child.)  Syncopal episode: seen by Cardiology  History of HSV:  Valtrex 500 mg b.i.d. prophylaxis initiated 2024  LGSIL on Pap smear  Intrauterine growth restriction  [ ] Twice weekly antepartum testing  [ ] Plan induction of labor 38-39 weeks     FINAL EDC:    3/19/2024 by US done 2023 @ 12-2/7 weeks      SO Name: Favian Cosby ANATOMY SCAN:    SIZE = DATES (EFW = 419 gms at 13% and  AC 11%)  ANATOMY: No fetal structural abnormalities appreciated but incomplete fetal anatomical survey  PLACENTA: Posterior placenta and placental edge to cervix appears wnl per transabdominal ultrasound but suboptimal visualization  OTHER: Cervical length screen via transabdominal ultrasound wnl.    INITIAL LABS:    MBT: A positive  AB SCREEN: negative  RPR: negative  RUBELLA: Immune  HEPATITIS A/B/C: negative  HIV: negative  CBC: DATE: 11/8/2023         7.5 >--- 11.5 / 32.4 ---< 215  HGB: normal  URINE CX: negative  Other:      10-12 WEEK LABS:    PAP: low-grade squamous intraepithelial neoplasia (LGSIL - encompassing HPV,mild dysplasia,RENA I) / Date: 7/10/2023    ALLA/CHLAM: negative x 2    cfDNA ([id number]):  Negative per patient    CARRIER SCREEN (Inheritest Core):  Patient reports previous testing for negative carrier status   28 WEEK LABS:    12/19/2023  Ferritin: 5  1 hour OGTT: 109  CBC noted: 7 > 10 / 28.7 < 204       OTHER LABS:     OTHER ULTRASOUNDS:    01/26/2024:  Preliminary results of ultrasound and a single intrauterine pregnancy at 30-1/7 weeks, 3 lb 5 oz with inappropriate AMY. TO-DO THROUGHOUT PREGNANCY:    TDAP (27-36wks): 1/2/2024    Plans for epidural: 2/15/2024    Consent for delivery: 2/15/2024   MEDICATIONS:    Prenatal vitamins  Promethazine 25 mg tablet   Lexapro 20 mg  Klonopin 0.5 mg PRN  Lamictal 25 mg  ALLERGIES:    Clindamycin: Shortness of breath and hives    MEDICAL HISTORY:    Depression, anxiety and PTSD.  Pre-pregnancy BMI = 19.3 SURGICAL HISTORY:    Negative    SOCIAL HISTORY:    Smoker:  Positive for vaping  Alcohol: denies  Drugs: denies  Relationship: co-habitating  Domestic Violence: no  Lives with:  FOB and four children  Education Level: Some College  Occupation: homemaker  Mosque:  Anabaptism  Other:   GYN HISTORY:    PAP'S: no h/o abnormals  STI'S: no past history  GENITAL HSV:  The patient reports previous positive testing but no reported outbreaks.  She was placed on  HSV prophylaxis with previous pregnancies FAMILY HISTORY:    HTN: Yes - mother  DIABETES: Yes - mother and sibling  BLEEDING D/O: No  CLOTTING D/O: No  BIRTH DEFECTS: No  MENTAL DISABILITY: No  TWINS OR MORE: No  GYN CANCER: No  Other:     OBSTETRIC HISTORY   Month/Year Mode of Delivery EGA Wt. M/F Complications / Comments   2015    Male Now    2018    Male    2019    Female                      Plan:      Poor fetal growth affecting management of mother in third trimester, fetus 1 of multiple gestation    37 weeks gestation of pregnancy    HSV (herpes simplex virus) infection        Follow up As needed or for postpartum evaluation.     Pilo Peng MD  Department OBGYN [facility]        Preliminary results on today's biophysical profile was  with two points off for no breathing.    Nonstress Test Note    PATIENT NAME: Josefa Oliver    MRN: [id number]  TODAY'S DATE: 2024  ADMIT DATE: (Not on file)    Josefa Oliver is a 28 y.o. 37-2/7   who presents today at 37w2d for antepartum testing.   Estimated Date of Delivery: 3/19/24.     Indication for NST:  Intrauterine growth restriction     Vitals:    24 0850   BP: 100/70   Weight: 55.6 kg (122 lb 9.2 oz)         Fetal activity:  Patient reports positive fetal movement     FHR baseline:  Low in 110's bpm  Variability:  Moderate   Accelerations:  Present   Decelerations:  None      No significant uterine contractions on toco.  Some mild irritability     NST:  REACTIVE & REASSURING    In light of today's reactive NST overall biophysical profile results:  8/10    Pilo Peng MD  Department OBGYN [facility]

## 2024-03-02 ENCOUNTER — ANESTHESIA (OUTPATIENT)
Dept: OBSTETRICS AND GYNECOLOGY | Facility: HOSPITAL | Age: 29
End: 2024-03-02
Payer: MEDICAID

## 2024-03-02 ENCOUNTER — ANESTHESIA EVENT (OUTPATIENT)
Dept: OBSTETRICS AND GYNECOLOGY | Facility: HOSPITAL | Age: 29
End: 2024-03-02
Payer: MEDICAID

## 2024-03-02 ENCOUNTER — HOSPITAL ENCOUNTER (INPATIENT)
Facility: HOSPITAL | Age: 29
LOS: 2 days | Discharge: HOME | End: 2024-03-04
Attending: OBSTETRICS & GYNECOLOGY | Admitting: OBSTETRICS & GYNECOLOGY
Payer: MEDICAID

## 2024-03-02 DIAGNOSIS — Z3A.38 38 WEEKS GESTATION OF PREGNANCY: ICD-10-CM

## 2024-03-02 DIAGNOSIS — O36.5931 IUGR (INTRAUTERINE GROWTH RESTRICTION) AFFECTING CARE OF MOTHER, THIRD TRIMESTER, FETUS 1: ICD-10-CM

## 2024-03-02 PROBLEM — Z3A.37 37 WEEKS GESTATION OF PREGNANCY: Status: ACTIVE | Noted: 2024-03-02

## 2024-03-02 PROBLEM — O36.5930 POOR FETAL GROWTH AFFECTING MANAGEMENT OF MOTHER IN THIRD TRIMESTER: Status: ACTIVE | Noted: 2024-03-02

## 2024-03-02 LAB
ABO + RH BLD: NORMAL
AMPHET+METHAMPHET UR QL: NEGATIVE
BARBITURATES UR QL SCN>200 NG/ML: NEGATIVE
BASOPHILS # BLD AUTO: 0.02 K/UL (ref 0–0.2)
BASOPHILS NFR BLD: 0.3 % (ref 0–1.9)
BENZODIAZ UR QL SCN>200 NG/ML: NEGATIVE
BLD GP AB SCN CELLS X3 SERPL QL: NORMAL
BZE UR QL SCN: NEGATIVE
CANNABINOIDS UR QL SCN: NEGATIVE
CREAT UR-MCNC: 29.7 MG/DL (ref 15–325)
DIFFERENTIAL METHOD BLD: ABNORMAL
EOSINOPHIL # BLD AUTO: 0 K/UL (ref 0–0.5)
EOSINOPHIL NFR BLD: 0.5 % (ref 0–8)
ERYTHROCYTE [DISTWIDTH] IN BLOOD BY AUTOMATED COUNT: 13.2 % (ref 11.5–14.5)
HCT VFR BLD AUTO: 30.3 % (ref 37–48.5)
HGB BLD-MCNC: 9.8 G/DL (ref 12–16)
IMM GRANULOCYTES # BLD AUTO: 0.05 K/UL (ref 0–0.04)
IMM GRANULOCYTES NFR BLD AUTO: 0.7 % (ref 0–0.5)
LYMPHOCYTES # BLD AUTO: 1.6 K/UL (ref 1–4.8)
LYMPHOCYTES NFR BLD: 22.1 % (ref 18–48)
MCH RBC QN AUTO: 28.7 PG (ref 27–31)
MCHC RBC AUTO-ENTMCNC: 32.3 G/DL (ref 32–36)
MCV RBC AUTO: 89 FL (ref 82–98)
MONOCYTES # BLD AUTO: 0.6 K/UL (ref 0.3–1)
MONOCYTES NFR BLD: 8.4 % (ref 4–15)
NEUTROPHILS # BLD AUTO: 5 K/UL (ref 1.8–7.7)
NEUTROPHILS NFR BLD: 68 % (ref 38–73)
NRBC BLD-RTO: 0 /100 WBC
OPIATES UR QL SCN: NEGATIVE
PCP UR QL SCN>25 NG/ML: NEGATIVE
PLATELET # BLD AUTO: 220 K/UL (ref 150–450)
PMV BLD AUTO: 9.5 FL (ref 9.2–12.9)
RBC # BLD AUTO: 3.41 M/UL (ref 4–5.4)
TOXICOLOGY INFORMATION: NORMAL
WBC # BLD AUTO: 7.29 K/UL (ref 3.9–12.7)

## 2024-03-02 PROCEDURE — 80307 DRUG TEST PRSMV CHEM ANLYZR: CPT | Performed by: OBSTETRICS & GYNECOLOGY

## 2024-03-02 PROCEDURE — 86592 SYPHILIS TEST NON-TREP QUAL: CPT | Performed by: OBSTETRICS & GYNECOLOGY

## 2024-03-02 PROCEDURE — 10907ZC DRAINAGE OF AMNIOTIC FLUID, THERAPEUTIC FROM PRODUCTS OF CONCEPTION, VIA NATURAL OR ARTIFICIAL OPENING: ICD-10-PCS | Performed by: OBSTETRICS & GYNECOLOGY

## 2024-03-02 PROCEDURE — 25000003 PHARM REV CODE 250: Performed by: OBSTETRICS & GYNECOLOGY

## 2024-03-02 PROCEDURE — 27200710 HC EPIDURAL INFUSION PUMP SET: Performed by: ANESTHESIOLOGY

## 2024-03-02 PROCEDURE — 85025 COMPLETE CBC W/AUTO DIFF WBC: CPT | Performed by: OBSTETRICS & GYNECOLOGY

## 2024-03-02 PROCEDURE — 63600175 PHARM REV CODE 636 W HCPCS: Performed by: ANESTHESIOLOGY

## 2024-03-02 PROCEDURE — C1751 CATH, INF, PER/CENT/MIDLINE: HCPCS | Performed by: ANESTHESIOLOGY

## 2024-03-02 PROCEDURE — 86850 RBC ANTIBODY SCREEN: CPT | Performed by: OBSTETRICS & GYNECOLOGY

## 2024-03-02 PROCEDURE — 72200005 HC VAGINAL DELIVERY LEVEL II

## 2024-03-02 PROCEDURE — 59409 OBSTETRICAL CARE: CPT | Mod: AA,TH,, | Performed by: ANESTHESIOLOGY

## 2024-03-02 PROCEDURE — 63600175 PHARM REV CODE 636 W HCPCS: Performed by: OBSTETRICS & GYNECOLOGY

## 2024-03-02 PROCEDURE — 62326 NJX INTERLAMINAR LMBR/SAC: CPT | Performed by: ANESTHESIOLOGY

## 2024-03-02 PROCEDURE — 59409 OBSTETRICAL CARE: CPT | Mod: TH,,, | Performed by: OBSTETRICS & GYNECOLOGY

## 2024-03-02 PROCEDURE — 51702 INSERT TEMP BLADDER CATH: CPT

## 2024-03-02 PROCEDURE — 12000002 HC ACUTE/MED SURGE SEMI-PRIVATE ROOM

## 2024-03-02 RX ORDER — SIMETHICONE 80 MG
1 TABLET,CHEWABLE ORAL EVERY 6 HOURS PRN
Status: DISCONTINUED | OUTPATIENT
Start: 2024-03-02 | End: 2024-03-04 | Stop reason: HOSPADM

## 2024-03-02 RX ORDER — OXYTOCIN/RINGER'S LACTATE 30/500 ML
334 PLASTIC BAG, INJECTION (ML) INTRAVENOUS ONCE
Status: DISCONTINUED | OUTPATIENT
Start: 2024-03-02 | End: 2024-03-04 | Stop reason: HOSPADM

## 2024-03-02 RX ORDER — METHYLERGONOVINE MALEATE 0.2 MG/ML
200 INJECTION INTRAVENOUS ONCE AS NEEDED
Status: DISCONTINUED | OUTPATIENT
Start: 2024-03-02 | End: 2024-03-04 | Stop reason: HOSPADM

## 2024-03-02 RX ORDER — SIMETHICONE 80 MG
1 TABLET,CHEWABLE ORAL 4 TIMES DAILY PRN
Status: DISCONTINUED | OUTPATIENT
Start: 2024-03-02 | End: 2024-03-04 | Stop reason: HOSPADM

## 2024-03-02 RX ORDER — OXYTOCIN 10 [USP'U]/ML
10 INJECTION, SOLUTION INTRAMUSCULAR; INTRAVENOUS ONCE AS NEEDED
Status: DISCONTINUED | OUTPATIENT
Start: 2024-03-02 | End: 2024-03-04 | Stop reason: HOSPADM

## 2024-03-02 RX ORDER — OXYCODONE AND ACETAMINOPHEN 5; 325 MG/1; MG/1
1 TABLET ORAL EVERY 4 HOURS PRN
Status: DISCONTINUED | OUTPATIENT
Start: 2024-03-02 | End: 2024-03-04 | Stop reason: HOSPADM

## 2024-03-02 RX ORDER — OXYTOCIN/RINGER'S LACTATE 30/500 ML
95 PLASTIC BAG, INJECTION (ML) INTRAVENOUS ONCE
Status: DISCONTINUED | OUTPATIENT
Start: 2024-03-02 | End: 2024-03-04 | Stop reason: HOSPADM

## 2024-03-02 RX ORDER — NALOXONE HCL 0.4 MG/ML
0.4 VIAL (ML) INJECTION SEE ADMIN INSTRUCTIONS
Status: DISCONTINUED | OUTPATIENT
Start: 2024-03-02 | End: 2024-03-04 | Stop reason: HOSPADM

## 2024-03-02 RX ORDER — DOCUSATE SODIUM 100 MG/1
200 CAPSULE, LIQUID FILLED ORAL 2 TIMES DAILY PRN
Status: DISCONTINUED | OUTPATIENT
Start: 2024-03-02 | End: 2024-03-04 | Stop reason: HOSPADM

## 2024-03-02 RX ORDER — SODIUM CHLORIDE 0.9 % (FLUSH) 0.9 %
10 SYRINGE (ML) INJECTION
Status: DISCONTINUED | OUTPATIENT
Start: 2024-03-02 | End: 2024-03-04 | Stop reason: HOSPADM

## 2024-03-02 RX ORDER — MISOPROSTOL 200 UG/1
800 TABLET ORAL ONCE AS NEEDED
Status: DISCONTINUED | OUTPATIENT
Start: 2024-03-02 | End: 2024-03-04 | Stop reason: HOSPADM

## 2024-03-02 RX ORDER — CARBOPROST TROMETHAMINE 250 UG/ML
250 INJECTION, SOLUTION INTRAMUSCULAR
Status: DISCONTINUED | OUTPATIENT
Start: 2024-03-02 | End: 2024-03-04 | Stop reason: HOSPADM

## 2024-03-02 RX ORDER — FENTANYL/BUPIVACAINE/NS/PF 2MCG/ML-.1
PLASTIC BAG, INJECTION (ML) INJECTION CONTINUOUS
Status: DISCONTINUED | OUTPATIENT
Start: 2024-03-02 | End: 2024-03-04 | Stop reason: HOSPADM

## 2024-03-02 RX ORDER — IBUPROFEN 400 MG/1
800 TABLET ORAL EVERY 8 HOURS
Status: DISCONTINUED | OUTPATIENT
Start: 2024-03-03 | End: 2024-03-04 | Stop reason: HOSPADM

## 2024-03-02 RX ORDER — SODIUM CHLORIDE, SODIUM LACTATE, POTASSIUM CHLORIDE, CALCIUM CHLORIDE 600; 310; 30; 20 MG/100ML; MG/100ML; MG/100ML; MG/100ML
INJECTION, SOLUTION INTRAVENOUS CONTINUOUS
Status: DISCONTINUED | OUTPATIENT
Start: 2024-03-02 | End: 2024-03-04 | Stop reason: HOSPADM

## 2024-03-02 RX ORDER — ROPIVACAINE HYDROCHLORIDE 2 MG/ML
12 INJECTION, SOLUTION EPIDURAL; INFILTRATION CONTINUOUS
Status: DISCONTINUED | OUTPATIENT
Start: 2024-03-02 | End: 2024-03-04 | Stop reason: HOSPADM

## 2024-03-02 RX ORDER — ACETAMINOPHEN 325 MG/1
650 TABLET ORAL EVERY 6 HOURS SCHEDULED
Status: DISCONTINUED | OUTPATIENT
Start: 2024-03-03 | End: 2024-03-04 | Stop reason: HOSPADM

## 2024-03-02 RX ORDER — DIPHENHYDRAMINE HCL 25 MG
25 CAPSULE ORAL EVERY 4 HOURS PRN
Status: DISCONTINUED | OUTPATIENT
Start: 2024-03-02 | End: 2024-03-04 | Stop reason: HOSPADM

## 2024-03-02 RX ORDER — MUPIROCIN 20 MG/G
OINTMENT TOPICAL
Status: DISCONTINUED | OUTPATIENT
Start: 2024-03-02 | End: 2024-03-04 | Stop reason: HOSPADM

## 2024-03-02 RX ORDER — LIDOCAINE HYDROCHLORIDE 10 MG/ML
10 INJECTION INFILTRATION; PERINEURAL ONCE AS NEEDED
Status: DISCONTINUED | OUTPATIENT
Start: 2024-03-02 | End: 2024-03-04 | Stop reason: HOSPADM

## 2024-03-02 RX ORDER — DIPHENOXYLATE HYDROCHLORIDE AND ATROPINE SULFATE 2.5; .025 MG/1; MG/1
2 TABLET ORAL EVERY 6 HOURS PRN
Status: DISCONTINUED | OUTPATIENT
Start: 2024-03-02 | End: 2024-03-04 | Stop reason: HOSPADM

## 2024-03-02 RX ORDER — ONDANSETRON 4 MG/1
8 TABLET, ORALLY DISINTEGRATING ORAL EVERY 8 HOURS PRN
Status: DISCONTINUED | OUTPATIENT
Start: 2024-03-02 | End: 2024-03-04 | Stop reason: HOSPADM

## 2024-03-02 RX ORDER — OXYTOCIN/RINGER'S LACTATE 30/500 ML
0-30 PLASTIC BAG, INJECTION (ML) INTRAVENOUS CONTINUOUS
Status: DISCONTINUED | OUTPATIENT
Start: 2024-03-02 | End: 2024-03-04 | Stop reason: HOSPADM

## 2024-03-02 RX ORDER — KETOROLAC TROMETHAMINE 30 MG/ML
30 INJECTION, SOLUTION INTRAMUSCULAR; INTRAVENOUS EVERY 8 HOURS
Status: DISCONTINUED | OUTPATIENT
Start: 2024-03-02 | End: 2024-03-02

## 2024-03-02 RX ORDER — IBUPROFEN 400 MG/1
800 TABLET ORAL EVERY 8 HOURS
Status: DISCONTINUED | OUTPATIENT
Start: 2024-03-03 | End: 2024-03-02

## 2024-03-02 RX ORDER — OXYTOCIN/RINGER'S LACTATE 30/500 ML
95 PLASTIC BAG, INJECTION (ML) INTRAVENOUS ONCE AS NEEDED
Status: DISCONTINUED | OUTPATIENT
Start: 2024-03-02 | End: 2024-03-04 | Stop reason: HOSPADM

## 2024-03-02 RX ORDER — EPHEDRINE SULFATE 50 MG/ML
5 INJECTION, SOLUTION INTRAVENOUS ONCE AS NEEDED
Status: DISCONTINUED | OUTPATIENT
Start: 2024-03-02 | End: 2024-03-04 | Stop reason: HOSPADM

## 2024-03-02 RX ORDER — ROPIVACAINE HYDROCHLORIDE 2 MG/ML
INJECTION, SOLUTION EPIDURAL; INFILTRATION
Status: COMPLETED | OUTPATIENT
Start: 2024-03-02 | End: 2024-03-02

## 2024-03-02 RX ORDER — ONDANSETRON HYDROCHLORIDE 2 MG/ML
4 INJECTION, SOLUTION INTRAVENOUS ONCE AS NEEDED
Status: DISCONTINUED | OUTPATIENT
Start: 2024-03-02 | End: 2024-03-04 | Stop reason: HOSPADM

## 2024-03-02 RX ORDER — CALCIUM CARBONATE 200(500)MG
500 TABLET,CHEWABLE ORAL 3 TIMES DAILY PRN
Status: DISCONTINUED | OUTPATIENT
Start: 2024-03-02 | End: 2024-03-04 | Stop reason: HOSPADM

## 2024-03-02 RX ORDER — OXYTOCIN/RINGER'S LACTATE 30/500 ML
334 PLASTIC BAG, INJECTION (ML) INTRAVENOUS ONCE AS NEEDED
Status: COMPLETED | OUTPATIENT
Start: 2024-03-02 | End: 2024-03-02

## 2024-03-02 RX ORDER — DIPHENHYDRAMINE HYDROCHLORIDE 50 MG/ML
25 INJECTION INTRAMUSCULAR; INTRAVENOUS EVERY 4 HOURS PRN
Status: DISCONTINUED | OUTPATIENT
Start: 2024-03-02 | End: 2024-03-04 | Stop reason: HOSPADM

## 2024-03-02 RX ORDER — ROPIVACAINE HYDROCHLORIDE 2 MG/ML
20 INJECTION, SOLUTION EPIDURAL; INFILTRATION ONCE AS NEEDED
Status: DISCONTINUED | OUTPATIENT
Start: 2024-03-02 | End: 2024-03-04 | Stop reason: HOSPADM

## 2024-03-02 RX ORDER — ONDANSETRON 4 MG/1
8 TABLET, ORALLY DISINTEGRATING ORAL EVERY 8 HOURS PRN
Status: DISCONTINUED | OUTPATIENT
Start: 2024-03-02 | End: 2024-03-02

## 2024-03-02 RX ORDER — HYDROCORTISONE 25 MG/G
CREAM TOPICAL 3 TIMES DAILY PRN
Status: DISCONTINUED | OUTPATIENT
Start: 2024-03-02 | End: 2024-03-04 | Stop reason: HOSPADM

## 2024-03-02 RX ORDER — DIPHENHYDRAMINE HYDROCHLORIDE 50 MG/ML
12.5 INJECTION INTRAMUSCULAR; INTRAVENOUS EVERY 4 HOURS PRN
Status: DISCONTINUED | OUTPATIENT
Start: 2024-03-02 | End: 2024-03-04 | Stop reason: HOSPADM

## 2024-03-02 RX ORDER — IBUPROFEN 400 MG/1
400 TABLET ORAL EVERY 4 HOURS
Qty: 40 TABLET | Refills: 0 | Status: SHIPPED | OUTPATIENT
Start: 2024-03-02 | End: 2024-04-15

## 2024-03-02 RX ORDER — TRANEXAMIC ACID 10 MG/ML
1000 INJECTION, SOLUTION INTRAVENOUS EVERY 30 MIN PRN
Status: DISCONTINUED | OUTPATIENT
Start: 2024-03-02 | End: 2024-03-04 | Stop reason: HOSPADM

## 2024-03-02 RX ORDER — PRENATAL WITH FERROUS FUM AND FOLIC ACID 3080; 920; 120; 400; 22; 1.84; 3; 20; 10; 1; 12; 200; 27; 25; 2 [IU]/1; [IU]/1; MG/1; [IU]/1; MG/1; MG/1; MG/1; MG/1; MG/1; MG/1; UG/1; MG/1; MG/1; MG/1; MG/1
1 TABLET ORAL DAILY
Status: DISCONTINUED | OUTPATIENT
Start: 2024-03-03 | End: 2024-03-04 | Stop reason: HOSPADM

## 2024-03-02 RX ORDER — KETOROLAC TROMETHAMINE 30 MG/ML
30 INJECTION, SOLUTION INTRAMUSCULAR; INTRAVENOUS EVERY 8 HOURS
Status: COMPLETED | OUTPATIENT
Start: 2024-03-02 | End: 2024-03-03

## 2024-03-02 RX ADMIN — OXYCODONE HYDROCHLORIDE AND ACETAMINOPHEN 1 TABLET: 5; 325 TABLET ORAL at 11:03

## 2024-03-02 RX ADMIN — SODIUM CHLORIDE, POTASSIUM CHLORIDE, SODIUM LACTATE AND CALCIUM CHLORIDE 1000 ML: 600; 310; 30; 20 INJECTION, SOLUTION INTRAVENOUS at 09:03

## 2024-03-02 RX ADMIN — Medication 334 MILLI-UNITS/MIN: at 01:03

## 2024-03-02 RX ADMIN — ROPIVACAINE HYDROCHLORIDE 10 ML: 2 INJECTION, SOLUTION EPIDURAL; INFILTRATION at 08:03

## 2024-03-02 RX ADMIN — KETOROLAC TROMETHAMINE 30 MG: 30 INJECTION, SOLUTION INTRAMUSCULAR; INTRAVENOUS at 05:03

## 2024-03-02 RX ADMIN — ACETAMINOPHEN 650 MG: 325 TABLET ORAL at 11:03

## 2024-03-02 RX ADMIN — Medication 2 MILLI-UNITS/MIN: at 10:03

## 2024-03-02 RX ADMIN — SODIUM CHLORIDE, POTASSIUM CHLORIDE, SODIUM LACTATE AND CALCIUM CHLORIDE: 600; 310; 30; 20 INJECTION, SOLUTION INTRAVENOUS at 09:03

## 2024-03-02 RX ADMIN — SODIUM CHLORIDE, POTASSIUM CHLORIDE, SODIUM LACTATE AND CALCIUM CHLORIDE 1000 ML: 600; 310; 30; 20 INJECTION, SOLUTION INTRAVENOUS at 08:03

## 2024-03-02 RX ADMIN — CALCIUM CARBONATE 500 MG: 500 TABLET, CHEWABLE ORAL at 09:03

## 2024-03-02 RX ADMIN — ROPIVACAINE HYDROCHLORIDE 12 ML/HR: 2 INJECTION, SOLUTION EPIDURAL; INFILTRATION at 09:03

## 2024-03-02 NOTE — NURSING
Nurses Note -- 4 Eyes      3/2/2024   10:18 AM      Skin assessed during: Admit      [x] No Altered Skin Integrity Present    []Prevention Measures Documented      [] Yes- Altered Skin Integrity Present or Discovered   [] LDA Added if Not in Epic (Describe Wound)   [] New Altered Skin Integrity was Present on Admit and Documented in LDA   [] Wound Image Taken    Wound Care Consulted? No    Attending Nurse:  GONZALO Isaac rn     Second RN/Staff Member:   AVERY Licona rn

## 2024-03-02 NOTE — H&P
Prenatal Note   Chief Complaint:  Contractions       Patient ID: Josefa Oliver is a  28 y.o. female.    Date: 2024    [facility]  Department of Obstetrics & Gynecology  OB History & Physical    PATIENT NAME: Josefa Oliver    MRN: [id number]  TODAY'S DATE: 2024  ADMIT DATE: 3/2/2024    3/2/2024    Reason for Admission:  Active labor    Prenatal Care:  [facility]    History of Present Illness:    Josefa Oliver is a 28 y.o.  who presents 3/2/2024 at 37w4d. Estimated Date of Delivery: 3/19/24.     The patient presents with complaints of increasing uterine contractions since last night.  Previous cervical exam in the office had been 1 cm but on presentation the patient was actively chikis with cervix 3-4 cm.    Review of patient's allergies indicates:   Allergen Reactions    Clindamycin Other (See Comments) and Rash       OB History    Para Term  AB Living   4 3 3     2   SAB IAB Ectopic Multiple Live Births           3      # Outcome Date GA Lbr Laurent/2nd Weight Sex Delivery Anes PTL Lv   4 Current            3 Term 19    F Vag-Spont   NARENDRA   2 Term 18    M Vag-Spont   NARENDRA   1 Term 11/16/15    M Vag-Spont   DEC      Obstetric Comments   Vaginal delivery x 3       Past Medical History:   Diagnosis Date    Abnormal Pap smear of cervix     Anxiety disorder, unspecified     Depression     Fall (on) (from) other stairs and steps, initial encounter 2023    Mental disorder        History reviewed. No pertinent surgical history.    No current facility-administered medications on file prior to encounter.     Current Outpatient Medications on File Prior to Encounter   Medication Sig Dispense Refill    clonazePAM (KLONOPIN) 0.5 MG tablet Take 0.5 mg by mouth daily as needed for Anxiety.      EScitalopram oxalate (LEXAPRO) 20 MG tablet Take 1 tablet (20 mg total) by mouth once daily. (Patient taking differently: Take 20 mg by mouth every evening.) 90 tablet 3     "famotidine (PEPCID) 20 MG tablet Take 1 tablet (20 mg total) by mouth 2 (two) times daily. 180 tablet 3    ferrous sulfate (FEOSOL) 325 mg (65 mg iron) Tab tablet Take 1 tablet (325 mg total) by mouth 4 (four) times a week. 48 tablet 3    lamoTRIgine (LAMICTAL) 25 MG tablet Take 50 mg by mouth every evening.      prenatal vit/iron fum/folic ac (PRENATAL 1+1 ORAL) Take 1 tablet by mouth every evening.      aspirin (ECOTRIN) 81 MG EC tablet Take 1 tablet (81 mg total) by mouth once daily. 90 tablet 1    galcanezumab-gnlm 120 mg/mL PnIj Inject 120 mg into the skin every 30 days.      prazosin (MINIPRESS) 1 MG Cap Take 1 mg by mouth.         Family History   Problem Relation Age of Onset    Hypertension Mother     Diabetes Mother     Drug/alcohol assessment Mother     Gestational diabetes Sister        Review of Systems:  Review of Systems   Constitutional:  Negative for fever.   Eyes:  Negative for blurred vision.   Respiratory:  Negative for shortness of breath.    Cardiovascular:  Negative for chest pain and palpitations.   Gastrointestinal: Negative.    Genitourinary: Negative.    Neurological:  Negative for headaches.       Vitals:    24 0700 24 0703   BP: 135/82 135/82   Pulse: 90 94   Resp:  18   Temp:  98.2 °F (36.8 °C)   SpO2: 99% 98%   Weight:  56.7 kg (125 lb)   Height:  5' 1" (1.549 m)        Physical Exam:  Physical Exam  Constitutional:       Appearance: Normal appearance.      Comments: Currently sitting up for epidural placement   Cardiovascular:      Rate and Rhythm: Normal rate.   Pulmonary:      Effort: Pulmonary effort is normal.   Neurological:      General: No focal deficit present.      Mental Status: She is alert.   Skin:     General: Skin is warm and dry.      Fetal assessment:   Heart tones have been reassuring.  Patient is currently sitting up for epidural.    Group B Strep: Negative    Assessment:   28 y.o.,   at 37w4d Gestation   Active labor   Depression:  Currently under " care of Psychiatry ([phone]) and on Lexapro 20 mg, Klonopin 0.5 mg PRN & Lamictal 25 mg (increased at this time secondary to pregnancy and depression over loss of 1st child.)  Syncopal episode: seen by Cardiology  History of HSV:  No recent outbreaks  LGSIL on Pap smear  Intrauterine growth restriction: Antepartum testing has been reassuring with initial plans for induction of labor at 38 week    Plan:  Patient has been admitted.  Epidural was being placed.  Reassuring maternal and fetal status at this time.    Counseled today on Risks, benefits and alternatives to vaginal delivery and c/section, including bleeding, infection, transfusion, and damage to pelvic organs and she agreed to proceed .    Expectant management    Pilo Peng MD FACOG  Date of Service: 03/02/2024     OB PROBLEM LIST:  Depression:  Currently under care of Psychiatry ([phone]) and on Lexapro 20 mg, Klonopin 0.5 mg PRN & Lamictal 25 mg (increased at this time secondary to pregnancy and depression over loss of 1st child.)  Syncopal episode: seen by Cardiology  History of HSV:  Valtrex 500 mg b.i.d. prophylaxis initiated 02/22/2024  LGSIL on Pap smear  Intrauterine growth restriction   FINAL EDC:    3/19/2024 by US done 9/8/2023 @ 12-2/7 weeks      SO Name: Favian Cosby ANATOMY SCAN:    SIZE = DATES (EFW = 419 gms at 13% and AC 11%)  ANATOMY: No fetal structural abnormalities appreciated but incomplete fetal anatomical survey  PLACENTA: Posterior placenta and placental edge to cervix appears wnl per transabdominal ultrasound but suboptimal visualization  OTHER: Cervical length screen via transabdominal ultrasound wnl.    INITIAL LABS:    MBT: A positive  AB SCREEN: negative  RPR: negative  RUBELLA: Immune  HEPATITIS A/B/C: negative  HIV: negative  CBC: DATE: 11/8/2023         7.5 >--- 11.5 / 32.4 ---< 215  HGB: normal  URINE CX: negative  Other:      10-12 WEEK LABS:    PAP: low-grade squamous intraepithelial neoplasia (LGSIL -  encompassing HPV,mild dysplasia,RENA I) / Date: 7/10/2023    ALLA/CHLAM: negative x 2    cfDNA ([id number]):  Negative per patient    CARRIER SCREEN (Inheritest Core):  Patient reports previous testing for negative carrier status   28 WEEK LABS:    2023  Ferritin: 5  1 hour OGTT: 109  CBC noted: 7 > 10 / 28.7 < 204       OTHER LABS:     OTHER ULTRASOUNDS:    2024:  Preliminary results of ultrasound and a single intrauterine pregnancy at 30-1/7 weeks, 3 lb 5 oz with inappropriate AMY. TO-DO THROUGHOUT PREGNANCY:    TDAP (27-36wks): 2024    Plans for epidural: 2/15/2024    Consent for delivery: 2/15/2024   MEDICATIONS:    Prenatal vitamins  Promethazine 25 mg tablet   Lexapro 20 mg  Klonopin 0.5 mg PRN  Lamictal 25 mg  ALLERGIES:    Clindamycin: Shortness of breath and hives    MEDICAL HISTORY:    Depression, anxiety and PTSD.  Pre-pregnancy BMI = 19.3 SURGICAL HISTORY:    Negative    SOCIAL HISTORY:    Smoker:  Positive for vaping  Alcohol: denies  Drugs: denies  Relationship: co-habitating  Domestic Violence: no  Lives with:  FOB and four children  Education Level: Some College  Occupation: homemaker  Quaker:  [employer]  Other:   GYN HISTORY:    PAP'S: no h/o abnormals  STI'S: no past history  GENITAL HSV:  The patient reports previous positive testing but no reported outbreaks.  She was placed on HSV prophylaxis with previous pregnancies FAMILY HISTORY:    HTN: Yes - mother  DIABETES: Yes - mother and sibling  BLEEDING D/O: No  CLOTTING D/O: No  BIRTH DEFECTS: No  MENTAL DISABILITY: No  TWINS OR MORE: No  GYN CANCER: No  Other:     OBSTETRIC HISTORY   Month/Year Mode of Delivery EGA Wt. M/F Complications / Comments   2015    Male Now    2018    Male    2019    Female                      Pilo Peng MD  Department OBGYN [facility]

## 2024-03-02 NOTE — ANESTHESIA PREPROCEDURE EVALUATION
03/02/2024  Josefa Oliver is a 28 y.o., female.      Pre-op Assessment    I have reviewed the Patient Summary Reports.     I have reviewed the Nursing Notes. I have reviewed the NPO Status.   I have reviewed the Medications.     Review of Systems  Anesthesia Hx:  No problems with previous Anesthesia   Neg history of prior surgery.          Denies Family Hx of Anesthesia complications.    Denies Personal Hx of Anesthesia complications.                    Hematology/Oncology:  Hematology Normal   Oncology Normal                                   EENT/Dental:  EENT/Dental Normal           Cardiovascular:  Cardiovascular Normal                                            Pulmonary:  Pulmonary Normal                       Renal/:  Renal/ Normal                 Hepatic/GI:  Hepatic/GI Normal                 Musculoskeletal:  Musculoskeletal Normal                Neurological:  Neurology Normal                                      Endocrine:  Endocrine Normal            Dermatological:  Skin Normal    Psych:   anxiety depression                Physical Exam  General: Well nourished and Alert    Airway:  Mallampati: II   Mouth Opening: Normal  TM Distance: Normal  Tongue: Normal  Neck ROM: Normal ROM    Dental:  Intact    Chest/Lungs:  Clear to auscultation, Normal Respiratory Rate    Heart:  Rate: Normal  Rhythm: Regular Rhythm  Sounds: Normal        Anesthesia Plan  Type of Anesthesia, risks & benefits discussed:    Anesthesia Type: Epidural  Intra-op Monitoring Plan: Standard ASA Monitors  Informed Consent: Informed consent signed with the Patient and all parties understand the risks and agree with anesthesia plan.  All questions answered.   ASA Score: 2    Ready For Surgery From Anesthesia Perspective.     .

## 2024-03-02 NOTE — PROGRESS NOTES
Fetal heart tones remained reassuring.    Cervix examined:  7 cm 90% / +  1 station cephalic    Expectant management    Pilo Peng MD  OBGYN [facility]

## 2024-03-02 NOTE — L&D DELIVERY NOTE
[facility]  Department of Obstetrics & Gynecology  Operative Note      PATIENT NAME: Josefa Oliver    MRN: [id number]  TODAY'S DATE: 2024  ADMIT DATE: 3/2/2024                              OPERATIVE REPORT:  3/2/2024    Vaginal Delivery Note:    Hospital:  [facility]     Diagnosis:  Estimated Date of Delivery: 3/19/24.  Intrauterine pregnancy at 37w4d  2.   Intrauterine growth restriction  3.   Depression  4.   History of HSV with no recent outbreaks.      Procedure:   Spontaneous Vaginal Delivery    Anesthesia: epidural: yes / Local: no    EBL: See QRL    Findings:     Delivery time: 3/2/2024, @ 1319   Sex: FEMALE   Apgar Score: 9 at 1 minute, 9 at 5 minute.   Complications: None     Episiotomy:  no,  Lacerations:    Cervical: no   Vaginal: no    Placenta: delivered spontaneously and appears intact.  Placenta will be sent for pathologic evaluation secondary to history of intrauterine growth restriction     Procedure:     The patient had been admitted earlier in the day:  After presenting in spontaneous labor      She progressed throughout labor without difficulty.  Epidural was placed for pain management.  Following artificial rupture of membranes noting clear fluid a Pitocin augmentation was initiated due to a dysfunctional labor pattern.  Fetal heart tones remained reassuring.     The patient was ultimately found to be complete and maternal pushing commenced.     With primarily maternal pushing there was a controlled spontaneous delivery of the infant's head.     The nose and mouth were bulb suctioned.  With continued maternal pushing the remainder of the infant was delivered without difficulty.       Cord blood was obtained.  Following appropriate amount of time the cord was then doubly clamped and cut (cord cut by FOB).  The placenta was allowed to spontaneously separate and was delivered without difficulty.      Evaluation of the perineum at this time noted no perineal  vaginal or other lacerations.  Good hemostasis noted.     Final evaluation noted good hemostasis with a firm fundus.  Mother and infant are currently doing well.     Nursery staff were present for delivery.     At the completion of the delivery all instruments and sponges counts were correct.    Mother and baby are currently doing well in the delivery room.    Pilo Peng MD  Department OBN  [facility]

## 2024-03-02 NOTE — PROGRESS NOTES
On license of [facility]  OB Labor Progress Note       Date: 2024     Pregnancy Issues:  Depression:  Currently under care of Psychiatry ([phone]) and on Lexapro 20 mg, Klonopin 0.5 mg PRN & Lamictal 25 mg (increased at this time secondary to pregnancy and depression over loss of 1st child.)  Syncopal episode: seen by Cardiology  History of HSV:  Valtrex 500 mg b.i.d. prophylaxis initiated 2024  LGSIL on Pap smear  Intrauterine growth restriction    Josefa Oliver is a  28 y.o. female at 37w4d    Epidural now in place and patient comfortable    OBJECTIVE:     Vital Signs Ranges:  Temp:  [98.2 °F (36.8 °C)] 98.2 °F (36.8 °C)  Pulse:  [78-94] 78  Resp:  [18] 18  SpO2:  [98 %-99 %] 98 %  BP: (120-135)/(73-82) 120/73    Physical Exam:    Fetal monitoring:   Fetal heart tones: 120's.  Good variability and accelerations.  Reassuring.  Category 1     Uterine contractions:  Irregular contractions    Cervical Exam:  4-5 cm cephalic 0 station artificial rupture of membranes clear fluid.    Lab Review:   Lab Results   Component Value Date    WBC 7.29 2024    HGB 9.8 (L) 2024    HCT 30.3 (L) 2024    MCV 89 2024     2024     Negative UDS.      ASSESSMENT:     Assessment:  28 y.o. female at 37w4d  Active Hospital Problems    Diagnosis    *37 weeks gestation of pregnancy    Poor fetal growth affecting management of mother in third trimester        Reassuring maternal & fetal status      PLAN:     Currently reassuring maternal and fetal status.    Above reviewed discussed with the patient and family.  Expectant management at this time.      Pilo Peng MD FACOG  Date of Service: 2024

## 2024-03-02 NOTE — NURSING
Former[facility]  Department of Obstetrics and Gynecology  Labor & Delivery Triage Assessment    PATIENT NAME: Josefa Oliver  MRN: [id number]  TODAY'S DATE: 2024    CHIEF COMPLAINT: Contractions      OB History    Para Term  AB Living   4 3 3 0 0 2   SAB IAB Ectopic Multiple Live Births   0 0 0 0 3      # Outcome Date GA Lbr Laurent/2nd Weight Sex Delivery Anes PTL Lv   4 Current            3 Term 19    F Vag-Spont   NARENDRA   2 Term 18    M Vag-Spont   NARENDRA   1 Term 11/16/15    M Vag-Spont   DEC      Obstetric Comments   Vaginal delivery x 3     Past Medical History:   Diagnosis Date    Abnormal Pap smear of cervix     Anxiety disorder, unspecified     Depression     Fall (on) (from) other stairs and steps, initial encounter 2023    Mental disorder      History reviewed. No pertinent surgical history.      VITAL SIGNS - ABNORMAL VITALS INCLUDE TEMP >100.4,RR <12 or >26, SUSTAINED MATERNAL PULSE <60 or >120     VITAL SIGNS (Most Recent)  Temp: 98.2 °F (36.8 °C) (24)  Pulse: 94 (24)  Resp: 18 (24)  BP: 135/82 (24)  SpO2: 98 % (24)    VITAL SIGNS     normal  HEADACHE    no     VOMITING    no  VISUAL DISTURBANCES  no  EPIGASTRIC PAIN        no  PROTEINURIA 2+ or MORE             no   EDEMA FACE/EXTREMITIES            no    FETAL MOVEMENT     FETAL MOVEMENT: Active  FETAL HEART RATE BASELINE =  125  normal    FETAL HEART RATE VARIABILITY:  Moderate  FETAL HEART RATE ACCELERATIONS FOR GESTATIONAL AGE: present  FETAL HEART RATE DECELERATIONS: none    ABDOMINAL PAIN/CRAMPING/CONTRACTIONS     Patient is complaining of abdominal pain/cramping/contractions. Contraction pattern is regular, < or = 5 minutes apart. Contraction strength moderate. Resting tone is relaxed. Abdominal palpation is non-tender.    RUPTURE OF MEMBRANES OR LEAKING OF AMNIOTIC FLUID     Patient denies ROM or leaking of amniotic fluid.    VAGINAL BLEEDING     Patient  denies vaginal bleeding.    VAGINAL EXAM     DILATION:  3.5    STATION:  -2  EFFACEMENT:  50  PRESENTATION:  vertex    VAGINAL EXAM DEFERRED DUE TO:   na    PAIN PRESENT ON ARRIVAL     ONSET:   overnight  LOCATION:  abdomen  PAIN SCALE (0-10):  6  DESCRIPTION: contractions    Interventions     None.      PATIENT DISPOSITION     Admitted to Labor & Delivery      Dr. Peng notified at 0711 of the above assessment.    Montserrat Isaac RN  [facility]  03/02/2024

## 2024-03-02 NOTE — PROGRESS NOTES
[facility]  OB Labor Progress Note       Date: 2024     Pregnancy Issues:  Depression:  Currently under care of Psychiatry ([phone]) and on Lexapro 20 mg, Klonopin 0.5 mg PRN & Lamictal 25 mg (increased at this time secondary to pregnancy and depression over loss of 1st child.)  Syncopal episode: seen by Cardiology  History of HSV:  Valtrex 500 mg b.i.d. prophylaxis initiated 2024  LGSIL on Pap smear  Intrauterine growth restriction    Josefa Oliver is a  28 y.o. female at 37w4d  Patient currently without complaints.    OBJECTIVE:     Vital Signs Ranges:  Temp:  [98.2 °F (36.8 °C)] 98.2 °F (36.8 °C)  Pulse:  [78-94] 78  Resp:  [18] 18  SpO2:  [98 %-99 %] 98 %  BP: (120-135)/(73-82) 120/73    Physical Exam:    Fetal monitoring:   Fetal heart tones: 120's.  Good variability and accelerations.  Reassuring.  Category 1     Uterine contractions:  Irregular contractions    Lab Review:   Lab Results   Component Value Date    WBC 7.29 2024    HGB 9.8 (L) 2024    HCT 30.3 (L) 2024    MCV 89 2024     2024     Negative UDS.      ASSESSMENT:     Assessment:  28 y.o. female at 37w4d  Active Hospital Problems    Diagnosis    *37 weeks gestation of pregnancy    Poor fetal growth affecting management of mother in third trimester        Reassuring maternal & fetal status      PLAN:     Overall labor pattern appears dysfunctional.  Fetal heart tones remained reassuring.  Discussed with the patient and family and will initiate Pitocin augmentation at this time.    Pros cons risks benefits of Pitocin reviewed and discussed.      Expectant management at this time.    Pilo Peng MD FACOG  Date of Service: 2024

## 2024-03-02 NOTE — ANESTHESIA PROCEDURE NOTES
Epidural    Patient location during procedure: OB   Reason for block: primary anesthetic   Reason for block: labor analgesia requested by patient and obstetrician  Diagnosis: Intrauterine pregnancy   Start time: 3/2/2024 8:40 AM  Timeout: 3/2/2024 8:40 AM  End time: 3/2/2024 8:50 AM    Staffing  Performing Provider: Alexandre Herrera Jr., MD  Authorizing Provider: Alexandre Herrera Jr., MD    Staffing  Performed by: Alexandre Herrera Jr., MD  Authorized by: Alexandre Herrera Jr., MD        Preanesthetic Checklist  Completed: patient identified, IV checked, site marked, risks and benefits discussed, surgical consent, monitors and equipment checked, pre-op evaluation, timeout performed, anesthesia consent given, hand hygiene performed and patient being monitored  Preparation  Patient position: sitting  Prep: Betadine and ChloraPrep  Patient monitoring: ECG and Blood Pressure  Reason for block: primary anesthetic   Epidural  Skin Anesthetic: lidocaine 1%  Skin Wheal: 3 mL  Administration type: continuous  Approach: midline  Interspace: L3-4    Injection technique: RENÉE air  Needle and Epidural Catheter  Needle type: Tuohy   Needle gauge: 17  Needle length: 3.5 inches  Catheter type: springwound and multi-orifice  Catheter size: 19 G  Insertion Attempts: 1  Test dose: 3 mL of lidocaine 1.5% with Epi 1-to-200,000  Additional Documentation: incremental injection, negative aspiration for heme and CSF, no paresthesia on injection, no significant pain on injection, no significant complaints from patient and no signs/symptoms of IV or SA injection  Needle localization: anatomical landmarks  Assessment  Upper dermatomal levels - Left: T6  Right: T6   Dermatomal levels determined by pinch or prick  Ease of block: easy  Patient's tolerance of the procedure: no complaints and comfortable throughout block No inadvertent dural puncture with Tuohy.  Dural puncture not performed with spinal needle    Medications:    Medications:  ropivacaine (NAROPIN) solution 0.2% - Epidural   10 mL - 3/2/2024 8:48:00 AM

## 2024-03-03 LAB
BASOPHILS # BLD AUTO: 0.01 K/UL (ref 0–0.2)
BASOPHILS NFR BLD: 0.1 % (ref 0–1.9)
DIFFERENTIAL METHOD BLD: ABNORMAL
EOSINOPHIL # BLD AUTO: 0.1 K/UL (ref 0–0.5)
EOSINOPHIL NFR BLD: 1 % (ref 0–8)
ERYTHROCYTE [DISTWIDTH] IN BLOOD BY AUTOMATED COUNT: 13.4 % (ref 11.5–14.5)
HCT VFR BLD AUTO: 28.1 % (ref 37–48.5)
HGB BLD-MCNC: 8.8 G/DL (ref 12–16)
IMM GRANULOCYTES # BLD AUTO: 0.06 K/UL (ref 0–0.04)
IMM GRANULOCYTES NFR BLD AUTO: 0.7 % (ref 0–0.5)
LYMPHOCYTES # BLD AUTO: 2 K/UL (ref 1–4.8)
LYMPHOCYTES NFR BLD: 21.5 % (ref 18–48)
MCH RBC QN AUTO: 28.2 PG (ref 27–31)
MCHC RBC AUTO-ENTMCNC: 31.3 G/DL (ref 32–36)
MCV RBC AUTO: 90 FL (ref 82–98)
MONOCYTES # BLD AUTO: 0.8 K/UL (ref 0.3–1)
MONOCYTES NFR BLD: 8.6 % (ref 4–15)
NEUTROPHILS # BLD AUTO: 6.2 K/UL (ref 1.8–7.7)
NEUTROPHILS NFR BLD: 68.1 % (ref 38–73)
NRBC BLD-RTO: 0 /100 WBC
PLATELET # BLD AUTO: 179 K/UL (ref 150–450)
PMV BLD AUTO: 9.6 FL (ref 9.2–12.9)
RBC # BLD AUTO: 3.12 M/UL (ref 4–5.4)
WBC # BLD AUTO: 9.11 K/UL (ref 3.9–12.7)

## 2024-03-03 PROCEDURE — 36415 COLL VENOUS BLD VENIPUNCTURE: CPT | Performed by: OBSTETRICS & GYNECOLOGY

## 2024-03-03 PROCEDURE — 63600175 PHARM REV CODE 636 W HCPCS: Mod: UD | Performed by: OBSTETRICS & GYNECOLOGY

## 2024-03-03 PROCEDURE — 85025 COMPLETE CBC W/AUTO DIFF WBC: CPT | Performed by: OBSTETRICS & GYNECOLOGY

## 2024-03-03 PROCEDURE — 25000003 PHARM REV CODE 250: Performed by: OBSTETRICS & GYNECOLOGY

## 2024-03-03 PROCEDURE — 12000002 HC ACUTE/MED SURGE SEMI-PRIVATE ROOM

## 2024-03-03 RX ORDER — OXYTOCIN/RINGER'S LACTATE 30/500 ML
95 PLASTIC BAG, INJECTION (ML) INTRAVENOUS ONCE
Status: DISCONTINUED | OUTPATIENT
Start: 2024-03-03 | End: 2024-03-04 | Stop reason: HOSPADM

## 2024-03-03 RX ADMIN — KETOROLAC TROMETHAMINE 30 MG: 30 INJECTION, SOLUTION INTRAMUSCULAR; INTRAVENOUS at 09:03

## 2024-03-03 RX ADMIN — IBUPROFEN 800 MG: 400 TABLET, FILM COATED ORAL at 07:03

## 2024-03-03 RX ADMIN — PRENATAL VIT W/ FE FUMARATE-FA TAB 27-0.8 MG 1 TABLET: 27-0.8 TAB at 09:03

## 2024-03-03 RX ADMIN — ACETAMINOPHEN 650 MG: 325 TABLET ORAL at 01:03

## 2024-03-03 RX ADMIN — KETOROLAC TROMETHAMINE 30 MG: 30 INJECTION, SOLUTION INTRAMUSCULAR; INTRAVENOUS at 02:03

## 2024-03-03 RX ADMIN — OXYCODONE HYDROCHLORIDE AND ACETAMINOPHEN 1 TABLET: 5; 325 TABLET ORAL at 01:03

## 2024-03-03 RX ADMIN — ACETAMINOPHEN 650 MG: 325 TABLET ORAL at 07:03

## 2024-03-03 NOTE — SUBJECTIVE & OBJECTIVE
Interval History:  Ppd 1.-status post -no complaints    She is doing well this morning. She is tolerating a regular diet without nausea or vomiting. She is voiding spontaneously. She is ambulating. She has passed flatus, and has not a BM. Vaginal bleeding is mild. She denies fever or chills. Abdominal pain is mild and controlled with oral medications.     Objective:     Vital Signs (Most Recent):  Temp: 98.2 °F (36.8 °C) (24)  Pulse: 67 (24)  Resp: 16 (24)  BP: 109/68 (24)  SpO2: 99 % (24) Vital Signs (24h Range):  Temp:  [97.7 °F (36.5 °C)-98.6 °F (37 °C)] 98.2 °F (36.8 °C)  Pulse:  [] 67  Resp:  [16-18] 16  SpO2:  [98 %-100 %] 99 %  BP: ()/(52-83) 109/68     Weight: 56.7 kg (125 lb)  Body mass index is 23.62 kg/m².      Intake/Output Summary (Last 24 hours) at 3/3/2024 0931  Last data filed at 3/2/2024 1945  Gross per 24 hour   Intake 1514.74 ml   Output 1763 ml   Net -248.26 ml         Significant Labs:  Lab Results   Component Value Date    GROUPTRH A POS 2024    STREPBCULT No Group B Streptococcus isolated 2024     Recent Labs   Lab 24  0742   HGB 9.8*   HCT 30.3*       CBC:   Recent Labs   Lab 24  0742   WBC 7.29   RBC 3.41*   HGB 9.8*   HCT 30.3*      MCV 89   MCH 28.7   MCHC 32.3     I have personallly reviewed all pertinent lab results from the last 24 hours.    Physical Exam  General-no apparent distress resting comfortably in bed   Abdomen-soft not tender   Lochia-minimal   Uterus-firm below the umbilicus   Extremities-no calf tenderness  Review of Systems

## 2024-03-03 NOTE — ANESTHESIA POSTPROCEDURE EVALUATION
Anesthesia Post Evaluation    Patient: Josefa Oliver    Procedure(s) Performed: * No procedures listed *    Final Anesthesia Type: epidural      Patient location during evaluation: floor  Patient participation: Yes- Able to Participate  Level of consciousness: awake and alert and oriented  Post-procedure vital signs: reviewed and stable  Pain management: adequate  Airway patency: patent    PONV status at discharge: No PONV  Anesthetic complications: no      Cardiovascular status: blood pressure returned to baseline and hemodynamically stable  Respiratory status: unassisted, spontaneous ventilation and room air  Hydration status: euvolemic  Follow-up not needed.          Postpartum day #1 status post vaginal delivery with epidural analgesia. This morning patient is resting comfortably in bed, she is alert and oriented and without complaints. Patient denies headache, back pain, leg pain weakness or numbness.  Patient does report minimal soreness needle placement site. Patient was advised that it is normal to have discomfort at the needle placement site and that this should resolve over a period of approximately 2 weeks.  The patient was further advised that should her pain persist or worsen she could always contact [facility] to reach the anesthesiologist on-call or let her obstetrician no for any further follow-up if needed.  Epidural site examined and no bleeding bruising or discharge noted. No apparent anesthetic related complications. Please reconsult if needed.      Vitals Value Taken Time   /68 03/03/24 0149   Temp 36.8 °C (98.2 °F) 03/03/24 0149   Pulse 67 03/03/24 0149   Resp 16 03/03/24 0149   SpO2 99 % 03/03/24 0149         No case tracking events are documented in the log.      Pain/Jayce Score: Pain Rating Prior to Med Admin: 4 (3/3/2024  2:18 AM)  Pain Rating Post Med Admin: 2 (3/3/2024  3:16 AM)

## 2024-03-03 NOTE — PROGRESS NOTES
[facility]  Obstetrics  Postpartum Progress Note    Patient Name: Josefa Oliver  MRN: [id number]  Admission Date: 3/2/2024  Hospital Length of Stay: 1 days  Attending Physician: Pilo Peng MD  Primary Care Provider: Ted Hess NP    Subjective:     Principal Problem:37 weeks gestation of pregnancy    Hospital Course:  No notes on file    Interval History:  Ppd 1.-status post -no complaints    She is doing well this morning. She is tolerating a regular diet without nausea or vomiting. She is voiding spontaneously. She is ambulating. She has passed flatus, and has not a BM. Vaginal bleeding is mild. She denies fever or chills. Abdominal pain is mild and controlled with oral medications.     Objective:     Vital Signs (Most Recent):  Temp: 98.2 °F (36.8 °C) (24)  Pulse: 67 (24)  Resp: 16 (24)  BP: 109/68 (24)  SpO2: 99 % (24) Vital Signs (24h Range):  Temp:  [97.7 °F (36.5 °C)-98.6 °F (37 °C)] 98.2 °F (36.8 °C)  Pulse:  [] 67  Resp:  [16-18] 16  SpO2:  [98 %-100 %] 99 %  BP: ()/(52-83) 109/68     Weight: 56.7 kg (125 lb)  Body mass index is 23.62 kg/m².      Intake/Output Summary (Last 24 hours) at 3/3/2024 0931  Last data filed at 3/2/2024 194  Gross per 24 hour   Intake 1514.74 ml   Output 1763 ml   Net -248.26 ml         Significant Labs:  Lab Results   Component Value Date    GROUPTRH A POS 2024    STREPBCULT No Group B Streptococcus isolated 2024     Recent Labs   Lab 24  0742   HGB 9.8*   HCT 30.3*       CBC:   Recent Labs   Lab 24  0742   WBC 7.29   RBC 3.41*   HGB 9.8*   HCT 30.3*      MCV 89   MCH 28.7   MCHC 32.3     I have personallly reviewed all pertinent lab results from the last 24 hours.    Physical Exam  General-no apparent distress resting comfortably in bed   Abdomen-soft not tender   Lochia-minimal   Uterus-firm below the umbilicus   Extremities-no calf tenderness  Review of  Systems  Assessment/Plan:     28 y.o. female  for:    * 37 weeks gestation of pregnancy  Ppd 1.-status post -doing well        Disposition: As patient meets milestones, will plan to discharge .    Darren Henderson MD  Obstetrics  [facility]

## 2024-03-03 NOTE — PLAN OF CARE
Assessment completed: at bedside with mother    Address mother and baby will discharge home to: [address]   [place] [address]     History of Substance Abuse issues:  Mother denies                Assistive Treatment Programs or Medications?  Mother denies    History of Mental Health issues:  Mother denies    History of Domestic Violence:  Mother denies       03/03/24 1400   OB Discharge Planning Assessment   Assessment Type Discharge Planning Assessment   Source of Information patient;health record   Verified Demographic and Insurance Information Yes   Insurance Medicaid   Medicaid Other (see comments)  ([place] Medicaid)   Medicaid Insurance Primary   People in Home significant other;child(nabila), dependent   Name(s) of People in Home Favian Cosby [phone]   Relationship Status In relationship   Employed No   Highest Level of Education Technical School   Father's Involvement Fully Involved   Is Father signing the birth certificate Yes   Father's Address [address] [place] [address]   Father Currently Enrolled in School No   Family Involvement Moderate   Received Prenatal Care Yes   Transportation Anticipated family or friend will provide   Receive [place] Already certified, will apply for new born    Arrangements Self   Adoption Planned no   Infant Feeding Plan breastfeeding   Previous Breastfeeding Experience yes   Breast Pump Needed yes   Does baby have crib or safe sleep space? Yes   Do you have a car seat? Yes   Has other essential care items? Clothing;Bottles;Diapers   Pediatrician Dr. Contreras   Equipment Currently Used at Home none   Potential Discharge Needs None   DME Needed Upon Discharge  none   DCFS No indications (Indicators for Report)   Discharge Plan A Home with family   Discharge Plan B Home with family

## 2024-03-03 NOTE — NURSING TRANSFER
Nursing Transfer Note      3/2/2024   9:04 PM    Nurse giving handoff:Eve MASSEY RN    Nurse receiving handoff:Jaimie DAY     Reason patient is being transferred: postpartum    Transfer To: postpartum     Transfer via wheelchair    Transfer with belongings    Transported by eve massey rn     Transfer Vital Signs:  Blood Pressure:pending pp nurse charting  Heart Rate:^  O2:^  Temperature:^  Respirations:^    Telemetry: none  Order for Tele Monitor? No    Additional Lines: none     4eyes on Skin: yes    Medicines sent: none     Any special needs or follow-up needed: fundal checks     Patient belongings transferred with patient: Yes    Chart send with patient: Yes    Notified: spouse, friend with pt     Patient reassessed at: 2015 3/2/24 (date, time)  1  Upon arrival to floor: patient oriented to room, call bell in reach, and bed in lowest position   no

## 2024-03-04 ENCOUNTER — PATIENT MESSAGE (OUTPATIENT)
Dept: OBSTETRICS AND GYNECOLOGY | Facility: HOSPITAL | Age: 29
End: 2024-03-04

## 2024-03-04 VITALS
SYSTOLIC BLOOD PRESSURE: 100 MMHG | OXYGEN SATURATION: 98 % | WEIGHT: 125 LBS | DIASTOLIC BLOOD PRESSURE: 62 MMHG | TEMPERATURE: 98 F | HEIGHT: 61 IN | BODY MASS INDEX: 23.6 KG/M2 | RESPIRATION RATE: 16 BRPM | HEART RATE: 84 BPM

## 2024-03-04 LAB — RPR SER QL: NORMAL

## 2024-03-04 PROCEDURE — 99232 SBSQ HOSP IP/OBS MODERATE 35: CPT | Mod: ,,, | Performed by: OBSTETRICS & GYNECOLOGY

## 2024-03-04 PROCEDURE — 25000003 PHARM REV CODE 250: Performed by: OBSTETRICS & GYNECOLOGY

## 2024-03-04 RX ORDER — OXYCODONE AND ACETAMINOPHEN 5; 325 MG/1; MG/1
1 TABLET ORAL EVERY 4 HOURS PRN
Qty: 18 TABLET | Refills: 0 | Status: SHIPPED | OUTPATIENT
Start: 2024-03-04 | End: 2024-04-15

## 2024-03-04 RX ORDER — IBUPROFEN 600 MG/1
600 TABLET ORAL 3 TIMES DAILY
Qty: 30 TABLET | Refills: 0 | Status: SHIPPED | OUTPATIENT
Start: 2024-03-04

## 2024-03-04 RX ADMIN — DOCUSATE SODIUM 200 MG: 100 CAPSULE, LIQUID FILLED ORAL at 11:03

## 2024-03-04 RX ADMIN — ACETAMINOPHEN 650 MG: 325 TABLET ORAL at 01:03

## 2024-03-04 RX ADMIN — OXYCODONE HYDROCHLORIDE AND ACETAMINOPHEN 1 TABLET: 5; 325 TABLET ORAL at 12:03

## 2024-03-04 RX ADMIN — PRENATAL VIT W/ FE FUMARATE-FA TAB 27-0.8 MG 1 TABLET: 27-0.8 TAB at 11:03

## 2024-03-04 RX ADMIN — IBUPROFEN 800 MG: 400 TABLET, FILM COATED ORAL at 11:03

## 2024-03-04 RX ADMIN — IBUPROFEN 800 MG: 400 TABLET, FILM COATED ORAL at 03:03

## 2024-03-04 NOTE — DISCHARGE INSTRUCTIONS

## 2024-03-04 NOTE — PLAN OF CARE
03/04/24 1243   Final Note   Assessment Type Final Discharge Note   Anticipated Discharge Disposition Home   What phone number can be called within the next 1-3 days to see how you are doing after discharge? [phone]   Post-Acute Status   Discharge Delays None known at this time     Discharge orders and chart reviewed with no further post-acute discharge needs identified at this time.  At this time, patient is cleared for discharge from Case Management standpoint.

## 2024-03-04 NOTE — DISCHARGE SUMMARY
[facility]  Department of Obstetrics & Gynecology  Discharge Note  Discharge Summary:  3/4/2024    Hospital: [facility]    Admit Date: 3/2/2024    Discharge Date: 3/4/2024    Delivery Physician: Pilo Peng M.D., FACOG     Procedures Performed:  Vaginal Delivery    Hospital Course:      The patient was admitted after presenting to labor and delivery in active labor.    She underwent a vaginal delivery without difficulty.    The patient currently reports no significant postpartum issues..  Occasional issues with cramping but overall well controlled on oral medications  Ambulating without difficulty.   Tolerating regular diet.      Breast Feeding.  No reported breast issues.     Vital signs reviewed.  BP: 100/62    Labs:    Recent Results (from the past 336 hour(s))   CBC auto differential    Collection Time: 03/03/24  8:55 AM   Result Value Ref Range    WBC 9.11 3.90 - 12.70 K/uL    Hemoglobin 8.8 (L) 12.0 - 16.0 g/dL    Hematocrit 28.1 (L) 37.0 - 48.5 %    Platelets 179 150 - 450 K/uL   CBC with Auto Differential    Collection Time: 03/02/24  7:42 AM   Result Value Ref Range    WBC 7.29 3.90 - 12.70 K/uL    Hemoglobin 9.8 (L) 12.0 - 16.0 g/dL    Hematocrit 30.3 (L) 37.0 - 48.5 %    Platelets 220 150 - 450 K/uL        No pulmonary or cardiac issues noted.  Heart: Regular rate and rhythm  Lungs:  No abnormal pulmonary effort.  Clear to auscultation.    Abdomen is soft with firm fundus and appropriate tenderness.     Discharge Diet: Regular     Discharge Activity: Pelvic rest x 6 weeks, light activity x 2 weeks.    Discharge Medications:      Medication List        START taking these medications      * ibuprofen 400 MG tablet  Commonly known as: ADVIL,MOTRIN  Take 1 tablet (400 mg total) by mouth every 4 (four) hours.     * ibuprofen 600 MG tablet  Commonly known as: ADVIL,MOTRIN  Take 1 tablet (600 mg total) by mouth 3 (three) times daily.     oxyCODONE-acetaminophen 5-325 mg per  tablet  Commonly known as: PERCOCET  Take 1 tablet by mouth every 4 (four) hours as needed for Pain.           * This list has 2 medication(s) that are the same as other medications prescribed for you. Read the directions carefully, and ask your doctor or other care provider to review them with you.                CHANGE how you take these medications      EScitalopram oxalate 20 MG tablet  Commonly known as: LEXAPRO  Take 1 tablet (20 mg total) by mouth once daily.  What changed: when to take this            CONTINUE taking these medications      clonazePAM 0.5 MG tablet  Commonly known as: KlonoPIN     famotidine 20 MG tablet  Commonly known as: PEPCID  Take 1 tablet (20 mg total) by mouth 2 (two) times daily.     ferrous sulfate 325 mg (65 mg iron) Tab tablet  Commonly known as: FEOSOL  Take 1 tablet (325 mg total) by mouth 4 (four) times a week.     lamoTRIgine 25 MG tablet  Commonly known as: LAMICTAL     PRENATAL 1+1 ORAL            STOP taking these medications      acyclovir 400 MG tablet  Commonly known as: ZOVIRAX     aspirin 81 MG EC tablet  Commonly known as: ECOTRIN            ASK your doctor about these medications      galcanezumab-gnlm 120 mg/mL Pnij     prazosin 1 MG Cap  Commonly known as: MINIPRESS               Where to Get Your Medications        These medications were sent to [id number] - [place], [place] - [address] AT [facility] [address]  [address], [place] [address]      Phone: [phone]   ibuprofen 400 MG tablet  ibuprofen 600 MG tablet  oxyCODONE-acetaminophen 5-325 mg per tablet          Discharge Follow-Up: 6 Weeks or as needed.    Condition on Discharge:  stable    Discharge Diagnosis:   S/P:  Vaginal Delivery    Precautions and instructions discussed prior to discarge, call with any postoperative/postpartum issues.  Signs and symptoms to monitor in regards to history of depression and current postpartum.  Reviewed and discussed.    The  above was reviewed and discussed with the patient.    The patient's questions regarding discharge were answered and she is in agreement with the discharge plan.    Pilo Peng MD  Department OBN  [facility]

## 2024-03-04 NOTE — LACTATION NOTE
This note was copied from a baby's chart.     03/04/24 1131   Maternal Assessment   Breast Size Issue none   Breast Shape round   Breast Density soft   Areola elastic   Nipples everted   Left Nipple Symptoms tender;scabbed;cracked   Maternal Infant Feeding   Maternal Emotional State assist needed   Infant Positioning cradle   Signs of Milk Transfer audible swallow   Pain with Feeding no   Latch Assistance no     Mom breastfeeding now. Good nutritive sucking & swallowing noted. Instructed on proper latch to facilitate effective breastfeeding.  Discussed recognizing hunger cues, appropriate positioning and wide mouth latch.  Discussed ways to determine an effective latch including:  areola included in latch, rhythmic/nutritive sucking and audible swallowing.  Also discussed soreness/tenderness associated with latch and prevention and treatment. Assistance offered prn.  Mom states understanding and verbalized appropriate recall.

## 2024-03-06 ENCOUNTER — TELEPHONE (OUTPATIENT)
Dept: OBSTETRICS AND GYNECOLOGY | Facility: CLINIC | Age: 29
End: 2024-03-06
Payer: MEDICAID

## 2024-03-06 NOTE — TELEPHONE ENCOUNTER
RENATEM for pt to contact office to schedule her 6 wk PP visit. Pt was notified that we were cancelling her ultrasound and appt with Dr. Arias.

## 2024-03-13 ENCOUNTER — TELEPHONE (OUTPATIENT)
Dept: OBSTETRICS AND GYNECOLOGY | Facility: CLINIC | Age: 29
End: 2024-03-13
Payer: MEDICAID

## 2024-03-13 NOTE — TELEPHONE ENCOUNTER
----- Message from Alda Copeland sent at 3/13/2024 12:45 PM CDT -----  Contact: Self  Type: Needs Medical Advice    Who Called:  Patient  What is this regarding?:  She had her daughter on 3/2. She still has bleeding a just had a large blood cot and cramping. Should she schedule for just go to the ER?  Best Call Back Number:  [phone]  Additional Information:  Please call the patient back at the phone number listed above to advise. Thank you!

## 2024-03-13 NOTE — TELEPHONE ENCOUNTER
Spoke with pt to see how she was feeling. Pt states she's been bleeding and cramping since having her daughter 3/2/24. Pt was advised, per Dr. Peng, to go to ED. Pt  verbalized understanding.

## 2024-03-20 ENCOUNTER — PATIENT MESSAGE (OUTPATIENT)
Dept: OBSTETRICS AND GYNECOLOGY | Facility: CLINIC | Age: 29
End: 2024-03-20
Payer: MEDICAID

## 2024-03-20 NOTE — TELEPHONE ENCOUNTER
Contacted pt via phone and scheduled her an appt in the morning with Dr Arias to be evaluated. Pt voiced understanding.

## 2024-04-15 ENCOUNTER — POSTPARTUM VISIT (OUTPATIENT)
Dept: OBSTETRICS AND GYNECOLOGY | Facility: CLINIC | Age: 29
End: 2024-04-15
Payer: MEDICAID

## 2024-04-15 VITALS
SYSTOLIC BLOOD PRESSURE: 110 MMHG | HEIGHT: 61 IN | BODY MASS INDEX: 21.23 KG/M2 | DIASTOLIC BLOOD PRESSURE: 74 MMHG | WEIGHT: 112.44 LBS

## 2024-04-15 DIAGNOSIS — R87.612 LGSIL ON PAP SMEAR OF CERVIX: ICD-10-CM

## 2024-04-15 DIAGNOSIS — Z30.09 ENCOUNTER FOR COUNSELING REGARDING CONTRACEPTION: ICD-10-CM

## 2024-04-15 PROBLEM — Z3A.25 25 WEEKS GESTATION OF PREGNANCY: Status: RESOLVED | Noted: 2023-12-05 | Resolved: 2024-04-15

## 2024-04-15 PROBLEM — O36.5930 POOR FETAL GROWTH AFFECTING MANAGEMENT OF MOTHER IN THIRD TRIMESTER: Status: RESOLVED | Noted: 2024-03-02 | Resolved: 2024-04-15

## 2024-04-15 PROBLEM — Z3A.37 37 WEEKS GESTATION OF PREGNANCY: Status: RESOLVED | Noted: 2024-03-02 | Resolved: 2024-04-15
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
## 2024-04-15 PROCEDURE — 99213 OFFICE O/P EST LOW 20 MIN: CPT | Mod: PBBFAC,PO | Performed by: OBSTETRICS & GYNECOLOGY

## 2024-04-15 PROCEDURE — 99999 PR PBB SHADOW E&M-EST. PATIENT-LVL III: CPT | Mod: PBBFAC,,, | Performed by: OBSTETRICS & GYNECOLOGY

## 2024-04-15 PROCEDURE — 0503F POSTPARTUM CARE VISIT: CPT | Mod: CPTII,,, | Performed by: OBSTETRICS & GYNECOLOGY

## 2024-04-15 RX ORDER — DEXMETHYLPHENIDATE HYDROCHLORIDE 10 MG/1
10 CAPSULE, EXTENDED RELEASE ORAL DAILY
COMMUNITY

## 2024-04-15 NOTE — PROGRESS NOTES
"  Prenatal Note   Chief Complaint:  Postpartum Care       Patient ID: Josefa Oliver is a  29 y.o. female.    Date: 04/15/2024    Postpartum Office Note    The patient presents for postpartum evaluation.  She is status post a vaginal delivery on 3/2/2024.   She is currently without complaint.   She denies any depression/mood complaints.   Results of her depression screen (Score: 8) were discussed.  She is doing well on her current antidepressants.    No abnormal bleeding.    No pain.    Bottle feeding.  No breast engorgement issues.      Delivery Information    PATIENT NAME: Josefa Oliver    MRN: [id number]  TODAY'S DATE: 2024  ADMIT DATE: 3/2/2024                                                  OPERATIVE REPORT:  3/2/2024     Vaginal Delivery Note:     Hospital:  [facility]      Diagnosis:  Estimated Date of Delivery: 3/19/24.  Intrauterine pregnancy at 37w4d  2.   Intrauterine growth restriction  3.   Depression  4.   History of HSV with no recent outbreaks.       Procedure:   Spontaneous Vaginal Delivery     Anesthesia: epidural: yes / Local: no     Findings:      Delivery time: 3/2/2024, @ 1319   Sex: FEMALE   Apgar Score: 9 at 1 minute, 9 at 5 minute.   Complications: None      Episiotomy:  no,  Lacerations:               Cervical: no              Vaginal: no    Review of Systems   Constitutional:  Negative for fever.   Eyes:  Negative for blurred vision.   Respiratory:  Negative for shortness of breath.    Cardiovascular:  Negative for chest pain.   Gastrointestinal:  Negative for abdominal pain and heartburn.   Genitourinary:  Negative for dysuria.   Neurological:  Negative for headaches.        PE:  /74 (BP Location: Left arm, Patient Position: Sitting, BP Method: Medium (Manual))   Ht 5' 1" (1.549 m)   Wt 51 kg (112 lb 7 oz)   LMP 2023 (Exact Date)   Breastfeeding No   BMI 21.24 kg/m²     Physical Exam  Constitutional:       Appearance: Normal appearance. "     Genitourinary:    Vulva and vagina normal.   The external female genitalia was normal.   No tenderness in the vagina. Cardiovascular:      Rate and Rhythm: Normal rate.   Pulmonary:      Effort: Pulmonary effort is normal.   Neurological:      General: No focal deficit present.      Mental Status: She is alert.      Deep Tendon Reflexes: Reflexes are normal and symmetric.   Skin:     General: Skin is warm and dry.   Psychiatric:         Mood and Affect: Mood normal.          Assessment:  S/P:  Vaginal delivery    Plan:    The patient is currently doing well and without complaints.       Contraceptive options were reviewed discussed with the patient.  At this time we will proceed with:  A 30 mcg OCP.  The pros, cons, risks, benefits, alternatives and indications of the medication(s) prescribed, as well as appropriate use and potential side effects were discussed.    The patient's history of a low-grade lesion on Pap smear was discussed and will plan on repeat Pap smear and evaluation of birth control in three months.    In regards to her depression she will continue care under her psychiatrist.    The patient's questions regarding the above were answered and she is in agreement with the current plan.    Pilo Peng MD  Department OBGYN  [facility]     OB PROBLEM LIST:  Depression:  Currently under care of Psychiatry ([phone]) and on Lexapro 20 mg, Klonopin 0.5 mg PRN & Lamictal 25 mg (increased at this time secondary to pregnancy and depression over loss of 1st child.)  Syncopal episode: seen by Cardiology  History of HSV:  Valtrex 500 mg b.i.d. 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 TO-DO THROUGHOUT PREGNANCY:    TDAP (27-36wks): 1/2/2024    Plans for epidural: 2/15/2024    Consent for delivery: 2/15/2024   MEDICATIONS:    Prenatal vitamins  Promethazine 25 mg tablet   Lexapro 20 mg  Klonopin 0.5 mg PRN  Lamictal 25 mg  ALLERGIES:    Clindamycin: Shortness of breath and hives    MEDICAL HISTORY:    Depression, anxiety and PTSD.  Pre-pregnancy BMI = 19.3 SURGICAL HISTORY:    Negative    SOCIAL HISTORY:    Smoker:  Positive for vaping  Alcohol: denies  Drugs: denies  Relationship: co-habitating  Domestic Violence: no  Lives with:  FOB and four children  Education Level: Some College  Occupation: homemaker  Gnosticist:  Hinduism  Other:   GYN HISTORY:    PAP'S: no h/o abnormals  STI'S: no past history  GENITAL HSV:  The patient reports previous positive testing but no reported outbreaks.  She was placed on HSV prophylaxis with previous  pregnancies FAMILY HISTORY:    HTN: Yes - mother  DIABETES: Yes - mother and sibling  BLEEDING D/O: No  CLOTTING D/O: No  BIRTH DEFECTS: No  MENTAL DISABILITY: No  TWINS OR MORE: No  GYN CANCER: No  Other:     OBSTETRIC HISTORY   Month/Year Mode of Delivery EGA Wt. M/F Complications / Comments   2015    Male Now    2018    Male    2019    Female                      Plan:      Postpartum care following vaginal delivery    LGSIL on Pap smear of cervix    Encounter for counseling regarding contraception  -     norgestrel-ethinyl estradioL (LO/OVRAL) 0.3-30 mg-mcg per tablet; Take 1 tablet by mouth once daily.  Dispense: 90 tablet; Refill: 3        Follow up in about 3 months (around 7/15/2024) for as needed / for any GYN related issues.     Pilo Peng MD  Department OBGYN  [facility]

## 2024-04-18 ENCOUNTER — PATIENT MESSAGE (OUTPATIENT)
Dept: OBSTETRICS AND GYNECOLOGY | Facility: CLINIC | Age: 29
End: 2024-04-18
Payer: MEDICAID

## 2024-06-16 ENCOUNTER — PATIENT MESSAGE (OUTPATIENT)
Dept: OBSTETRICS AND GYNECOLOGY | Facility: CLINIC | Age: 29
End: 2024-06-16
Payer: MEDICAID

## 2024-06-17 ENCOUNTER — TELEPHONE (OUTPATIENT)
Dept: GYNECOLOGY | Facility: HOSPITAL | Age: 29
End: 2024-06-17

## 2024-06-17 DIAGNOSIS — N30.00 ACUTE CYSTITIS WITHOUT HEMATURIA: Primary | ICD-10-CM

## 2024-06-17 RX ORDER — SULFAMETHOXAZOLE AND TRIMETHOPRIM 400; 80 MG/1; MG/1
1 TABLET ORAL 2 TIMES DAILY
Qty: 14 TABLET | Refills: 0 | Status: SHIPPED | OUTPATIENT
Start: 2024-06-17 | End: 2024-06-24

## 2024-06-17 NOTE — TELEPHONE ENCOUNTER
Notified pt medication has been sent to pharmacy, and scheduled office visit for testing Friday 6/21 at 3:20; pt voiced understanding.

## 2024-06-26 ENCOUNTER — OFFICE VISIT (OUTPATIENT)
Dept: OBSTETRICS AND GYNECOLOGY | Facility: CLINIC | Age: 29
End: 2024-06-26
Payer: MEDICAID

## 2024-06-26 ENCOUNTER — LAB VISIT (OUTPATIENT)
Dept: LAB | Facility: HOSPITAL | Age: 29
End: 2024-06-26
Attending: OBSTETRICS & GYNECOLOGY
Payer: MEDICAID

## 2024-06-26 VITALS
BODY MASS INDEX: 20.9 KG/M2 | WEIGHT: 110.69 LBS | DIASTOLIC BLOOD PRESSURE: 62 MMHG | SYSTOLIC BLOOD PRESSURE: 106 MMHG | HEIGHT: 61 IN

## 2024-06-26 DIAGNOSIS — Z11.3 ENCNTR SCREEN FOR INFECTIONS W SEXL MODE OF TRANSMISS: ICD-10-CM

## 2024-06-26 DIAGNOSIS — Z71.85 HPV VACCINE COUNSELING: ICD-10-CM

## 2024-06-26 DIAGNOSIS — Z30.09 ENCOUNTER FOR COUNSELING REGARDING CONTRACEPTION: Primary | ICD-10-CM

## 2024-06-26 LAB
HBV SURFACE AG SERPL QL IA: NORMAL
HCV AB SERPL QL IA: NORMAL
HIV 1+2 AB+HIV1 P24 AG SERPL QL IA: NORMAL
TREPONEMA PALLIDUM IGG+IGM AB [PRESENCE] IN SERUM OR PLASMA BY IMMUNOASSAY: NONREACTIVE

## 2024-06-26 PROCEDURE — 3078F DIAST BP <80 MM HG: CPT | Mod: CPTII,,, | Performed by: OBSTETRICS & GYNECOLOGY

## 2024-06-26 PROCEDURE — 90651 9VHPV VACCINE 2/3 DOSE IM: CPT | Mod: PBBFAC,PO

## 2024-06-26 PROCEDURE — 3008F BODY MASS INDEX DOCD: CPT | Mod: CPTII,,, | Performed by: OBSTETRICS & GYNECOLOGY

## 2024-06-26 PROCEDURE — 86593 SYPHILIS TEST NON-TREP QUANT: CPT | Performed by: OBSTETRICS & GYNECOLOGY

## 2024-06-26 PROCEDURE — 99212 OFFICE O/P EST SF 10 MIN: CPT | Mod: PBBFAC,PO | Performed by: OBSTETRICS & GYNECOLOGY

## 2024-06-26 PROCEDURE — 36415 COLL VENOUS BLD VENIPUNCTURE: CPT | Mod: PO | Performed by: OBSTETRICS & GYNECOLOGY

## 2024-06-26 PROCEDURE — 99999PBSHW PR PBB SHADOW TECHNICAL ONLY FILED TO HB: Mod: PBBFAC,,,

## 2024-06-26 PROCEDURE — 1159F MED LIST DOCD IN RCRD: CPT | Mod: CPTII,,, | Performed by: OBSTETRICS & GYNECOLOGY

## 2024-06-26 PROCEDURE — 87591 N.GONORRHOEAE DNA AMP PROB: CPT | Performed by: OBSTETRICS & GYNECOLOGY

## 2024-06-26 PROCEDURE — 87389 HIV-1 AG W/HIV-1&-2 AB AG IA: CPT | Performed by: OBSTETRICS & GYNECOLOGY

## 2024-06-26 PROCEDURE — 87340 HEPATITIS B SURFACE AG IA: CPT | Performed by: OBSTETRICS & GYNECOLOGY

## 2024-06-26 PROCEDURE — 99999 PR PBB SHADOW E&M-EST. PATIENT-LVL II: CPT | Mod: PBBFAC,,, | Performed by: OBSTETRICS & GYNECOLOGY

## 2024-06-26 PROCEDURE — 99214 OFFICE O/P EST MOD 30 MIN: CPT | Mod: S$PBB,,, | Performed by: OBSTETRICS & GYNECOLOGY

## 2024-06-26 PROCEDURE — 90471 IMMUNIZATION ADMIN: CPT | Mod: PBBFAC,PO

## 2024-06-26 PROCEDURE — 3074F SYST BP LT 130 MM HG: CPT | Mod: CPTII,,, | Performed by: OBSTETRICS & GYNECOLOGY

## 2024-06-26 PROCEDURE — 81514 NFCT DS BV&VAGINITIS DNA ALG: CPT | Performed by: OBSTETRICS & GYNECOLOGY

## 2024-06-26 PROCEDURE — 86803 HEPATITIS C AB TEST: CPT | Performed by: OBSTETRICS & GYNECOLOGY

## 2024-06-26 RX ORDER — NORETHINDRONE ACETATE AND ETHINYL ESTRADIOL .02; 1 MG/1; MG/1
1 TABLET ORAL DAILY
Qty: 90 TABLET | Refills: 3 | Status: SHIPPED | OUTPATIENT
Start: 2024-06-26 | End: 2025-06-26

## 2024-06-26 RX ORDER — DEXTROAMPHETAMINE SACCHARATE, AMPHETAMINE ASPARTATE MONOHYDRATE, DEXTROAMPHETAMINE SULFATE AND AMPHETAMINE SULFATE 5; 5; 5; 5 MG/1; MG/1; MG/1; MG/1
20 CAPSULE, EXTENDED RELEASE ORAL
COMMUNITY
Start: 2024-06-04 | End: 2024-07-04

## 2024-06-26 RX ADMIN — HUMAN PAPILLOMAVIRUS 9-VALENT VACCINE, RECOMBINANT 0.5 ML: 30; 40; 60; 40; 20; 20; 20; 20; 20 INJECTION, SUSPENSION INTRAMUSCULAR at 01:06

## 2024-06-26 NOTE — PROGRESS NOTES
Subjective:   Chief Complaint:  STD CHECK ( And discuss birth control)      Date: 2024     Patient ID: Josefa Oliver is a  29 y.o. female.    Contraception: None    Patient's last menstrual period was 2024 (exact date).    The patient presents today due to the following:  She discontinued her previous birth control but would like to discuss restarting Loestrin which she has used in the past without difficulty.    She would like STI testing.    She would like to discuss the Gardasil/HPV vaccine.    From a postpartum and gynecologic standpoint she is currently doing well.    GYN & OB History  LMP: Patient's last menstrual period was 2024 (exact date).     Age of Menarche:12  Age at first pregnancy:19   Age at first live birth:20  Number of months breastfeeding:    Age at Menopause:    Comments:     OB History          4    Para   4    Term   4            AB        Living   3         SAB        IAB        Ectopic        Multiple   0    Live Births   4           Obstetric Comments   Vaginal delivery x 3               Allergies:   Review of patient's allergies indicates:   Allergen Reactions    Clindamycin Other (See Comments) and Rash       Past Medical History:   Diagnosis Date    Abnormal Pap smear of cervix     Anxiety disorder, unspecified     Depression     Fall (on) (from) other stairs and steps, initial encounter 2023    Mental disorder        History reviewed. No pertinent surgical history.    Medications    Current Outpatient Medications:     dextroamphetamine-amphetamine (ADDERALL XR) 20 MG 24 hr capsule, Take 20 mg by mouth., Disp: , Rfl:     prenatal vit/iron fum/folic ac (PRENATAL 1+1 ORAL), Take 1 tablet by mouth every evening., Disp: , Rfl:     norethindrone-ethinyl estradiol (MICROGESTIN ) 1-20 mg-mcg per tablet, Take 1 tablet by mouth once daily., Disp: 90 tablet, Rfl: 3    Current Facility-Administered Medications:     VFC-hpv vaccine,9-iván (GARDASIL 9)  vaccine 0.5 mL, 0.5 mL, Intramuscular, 1 time in Clinic/HOD,      Social History     Tobacco Use    Smoking status: Former     Types: Cigarettes, Vaping with nicotine    Smokeless tobacco: Never   Substance Use Topics    Alcohol use: Not Currently    Drug use: Never       Family History   Problem Relation Name Age of Onset    Hypertension Mother      Diabetes Mother      Drug/alcohol assessment Mother      Gestational diabetes Sister         Review of Systems (at today's evaluation)  Review of Systems   Constitutional:  Negative for fever and unexpected weight change.   Respiratory: Negative.     Cardiovascular:  Negative for chest pain and palpitations.   Gastrointestinal:  Negative for nausea and vomiting.   Genitourinary:  Negative for dysuria, hematuria and urgency.        Gyn as per HPI   Neurological:  Negative for headaches.        Objective:      Vitals:    06/26/24 1329   BP: 106/62     Physical Exam:   Constitutional: She appears well-developed and well-nourished.    HENT:   Head: Normocephalic.     Neck: No thyroid mass present.    Cardiovascular:  Normal rate.             Pulmonary/Chest: Effort normal. No respiratory distress.        Abdominal: Soft. There is no abdominal tenderness.     Genitourinary:    Inguinal canal, vagina, uterus, right adnexa and left adnexa normal.      Pelvic exam was performed with patient supine.   The external female genitalia was normal.   No external genitalia lesions identified,Cervix is normal. Right adnexum displays no mass and no tenderness. Left adnexum displays no mass and no tenderness. No tenderness or bleeding in the vagina. Uterus is not tender. Normal urethral meatus.Urethra findings: no tendernessBladder findings: no bladder tenderness   Genitourinary Comments: A chaperone (female medical assistant) was present throughout the pelvic exam.             Musculoskeletal: Normal range of motion.      Right lower leg: No edema.      Left lower leg: No edema.       Lymphadenopathy:     She has no cervical adenopathy. No inguinal adenopathy noted on the right or left side.    Neurological: She is alert.    Skin: Skin is warm and dry.    Psychiatric: Mood normal.         Assessment:        1. Encounter for counseling regarding contraception    2. Encntr screen for infections w sexl mode of transmiss    3. HPV vaccine counseling        Plan:      Encounter for counseling regarding contraception  -     norethindrone-ethinyl estradiol (MICROGESTIN 1/20) 1-20 mg-mcg per tablet; Take 1 tablet by mouth once daily.  Dispense: 90 tablet; Refill: 3    Encntr screen for infections w sexl mode of transmiss  -     Hepatitis B Surface Antigen; Future; Expected date: 06/26/2024  -     Hepatitis C Antibody; Future; Expected date: 06/26/2024  -     HIV 1/2 Ag/Ab (4th Gen); Future; Expected date: 06/26/2024  -     C. trachomatis/N. gonorrhoeae by AMP DNA [facility]; Cervicovaginal  -     Vaginosis Screen by DNA Probe  -     Treponema Pallidium Antibodies IgG, IgM; Future; Expected date: 06/26/2024    HPV vaccine counseling  -     VFC-hpv vaccine,9-iván (GARDASIL 9) vaccine 0.5 mL       Follow up in about 2 months (around 8/26/2024) for Follow-up on today's evaluation, as needed / for any GYN related issues.     The above was reviewed discussed with the patient.  STI testing was obtained and lab work has been ordered   The patient is being started on a 20 mcg oral contraceptive, Loestrin.  The pros, cons, risks, benefits, alternatives and indications of the medication(s) prescribed, as well as appropriate use and potential side effects were discussed.    The patient was provided with GARDASIL 9 vaccine information.    She would like to receive the GARDASIL vaccine.  The pros cons risks benefits alternatives indications of the GARDASIL vaccine were discussed.  We discussed the 0, 2 months, and 6 months dosing.    The patient's questions were answered, and she is in agreement with the current plan.      Pilo Peng MD  Department OBGYN  [facility]

## 2024-06-27 LAB
BACTERIAL VAGINOSIS DNA: NEGATIVE
C TRACH DNA SPEC QL NAA+PROBE: NOT DETECTED
CANDIDA GLABRATA DNA: NEGATIVE
CANDIDA KRUSEI DNA: NEGATIVE
CANDIDA RRNA VAG QL PROBE: NEGATIVE
N GONORRHOEA DNA SPEC QL NAA+PROBE: NOT DETECTED
T VAGINALIS RRNA GENITAL QL PROBE: NEGATIVE

## 2024-07-29 ENCOUNTER — PATIENT MESSAGE (OUTPATIENT)
Dept: OBSTETRICS AND GYNECOLOGY | Facility: CLINIC | Age: 29
End: 2024-07-29
Payer: MEDICAID

## 2024-07-30 ENCOUNTER — TELEPHONE (OUTPATIENT)
Dept: OBSTETRICS AND GYNECOLOGY | Facility: CLINIC | Age: 29
End: 2024-07-30
Payer: MEDICAID

## 2024-07-30 ENCOUNTER — PATIENT MESSAGE (OUTPATIENT)
Dept: OBSTETRICS AND GYNECOLOGY | Facility: CLINIC | Age: 29
End: 2024-07-30
Payer: MEDICAID

## 2024-07-30 DIAGNOSIS — R11.0 NAUSEA: Primary | ICD-10-CM

## 2024-07-30 RX ORDER — ONDANSETRON 4 MG/1
4 TABLET, ORALLY DISINTEGRATING ORAL EVERY 6 HOURS PRN
Qty: 20 TABLET | Refills: 0 | Status: SHIPPED | OUTPATIENT
Start: 2024-07-30

## 2024-08-26 ENCOUNTER — PATIENT MESSAGE (OUTPATIENT)
Dept: OBSTETRICS AND GYNECOLOGY | Facility: CLINIC | Age: 29
End: 2024-08-26
Payer: MEDICAID

## 2024-09-10 DIAGNOSIS — Z30.09 ENCOUNTER FOR COUNSELING REGARDING CONTRACEPTION: ICD-10-CM

## 2024-09-10 NOTE — TELEPHONE ENCOUNTER
LOV: 6/26/24 Danish     NOV: 9/16/24 Danish       Preffered Pharmacy:  [id number] - [place], [place] - [address] AT [facility] [place] [address] [facility] [address]

## 2024-09-12 RX ORDER — NORETHINDRONE ACETATE AND ETHINYL ESTRADIOL .02; 1 MG/1; MG/1
1 TABLET ORAL DAILY
Qty: 90 TABLET | Refills: 3 | Status: SHIPPED | OUTPATIENT
Start: 2024-09-12 | End: 2025-09-12

## 2024-09-16 ENCOUNTER — OFFICE VISIT (OUTPATIENT)
Dept: OBSTETRICS AND GYNECOLOGY | Facility: CLINIC | Age: 29
End: 2024-09-16
Payer: MEDICAID

## 2024-09-16 VITALS
HEIGHT: 61 IN | DIASTOLIC BLOOD PRESSURE: 70 MMHG | BODY MASS INDEX: 20.44 KG/M2 | SYSTOLIC BLOOD PRESSURE: 116 MMHG | WEIGHT: 108.25 LBS

## 2024-09-16 DIAGNOSIS — Z30.09 ENCOUNTER FOR COUNSELING REGARDING CONTRACEPTION: ICD-10-CM

## 2024-09-16 DIAGNOSIS — N89.8 VAGINAL DISCHARGE: ICD-10-CM

## 2024-09-16 DIAGNOSIS — Z01.419 WELL WOMAN EXAM WITH ROUTINE GYNECOLOGICAL EXAM: Primary | ICD-10-CM

## 2024-09-16 DIAGNOSIS — Z12.4 SCREENING FOR MALIGNANT NEOPLASM OF CERVIX: ICD-10-CM

## 2024-09-16 DIAGNOSIS — Z71.85 HPV VACCINE COUNSELING: ICD-10-CM

## 2024-09-16 PROCEDURE — 3008F BODY MASS INDEX DOCD: CPT | Mod: CPTII,,, | Performed by: OBSTETRICS & GYNECOLOGY

## 2024-09-16 PROCEDURE — 3074F SYST BP LT 130 MM HG: CPT | Mod: CPTII,,, | Performed by: OBSTETRICS & GYNECOLOGY

## 2024-09-16 PROCEDURE — 99999PBSHW PR PBB SHADOW TECHNICAL ONLY FILED TO HB: Mod: PBBFAC,,,

## 2024-09-16 PROCEDURE — 90471 IMMUNIZATION ADMIN: CPT | Mod: PBBFAC,PO

## 2024-09-16 PROCEDURE — 90651 9VHPV VACCINE 2/3 DOSE IM: CPT | Mod: PBBFAC,PO

## 2024-09-16 PROCEDURE — 81514 NFCT DS BV&VAGINITIS DNA ALG: CPT | Performed by: OBSTETRICS & GYNECOLOGY

## 2024-09-16 PROCEDURE — 3078F DIAST BP <80 MM HG: CPT | Mod: CPTII,,, | Performed by: OBSTETRICS & GYNECOLOGY

## 2024-09-16 PROCEDURE — 99999 PR PBB SHADOW E&M-EST. PATIENT-LVL II: CPT | Mod: PBBFAC,,, | Performed by: OBSTETRICS & GYNECOLOGY

## 2024-09-16 PROCEDURE — 99395 PREV VISIT EST AGE 18-39: CPT | Mod: S$PBB,,, | Performed by: OBSTETRICS & GYNECOLOGY

## 2024-09-16 PROCEDURE — 88175 CYTOPATH C/V AUTO FLUID REDO: CPT | Performed by: PATHOLOGY

## 2024-09-16 PROCEDURE — 99212 OFFICE O/P EST SF 10 MIN: CPT | Mod: PBBFAC,PO | Performed by: OBSTETRICS & GYNECOLOGY

## 2024-09-16 PROCEDURE — 1159F MED LIST DOCD IN RCRD: CPT | Mod: CPTII,,, | Performed by: OBSTETRICS & GYNECOLOGY

## 2024-09-16 PROCEDURE — 87491 CHLMYD TRACH DNA AMP PROBE: CPT | Performed by: OBSTETRICS & GYNECOLOGY

## 2024-09-16 PROCEDURE — 87591 N.GONORRHOEAE DNA AMP PROB: CPT | Performed by: OBSTETRICS & GYNECOLOGY

## 2024-09-16 PROCEDURE — 87624 HPV HI-RISK TYP POOLED RSLT: CPT | Performed by: OBSTETRICS & GYNECOLOGY

## 2024-09-16 RX ORDER — NORETHINDRONE ACETATE AND ETHINYL ESTRADIOL .02; 1 MG/1; MG/1
1 TABLET ORAL DAILY
Qty: 90 TABLET | Refills: 3 | Status: SHIPPED | OUTPATIENT
Start: 2024-09-16 | End: 2025-09-16

## 2024-09-16 RX ADMIN — HUMAN PAPILLOMAVIRUS 9-VALENT VACCINE, RECOMBINANT 0.5 ML: 30; 40; 60; 40; 20; 20; 20; 20; 20 INJECTION, SUSPENSION INTRAMUSCULAR at 01:09

## 2024-09-16 NOTE — PROGRESS NOTES
SUBJECTIVE     Chief Complaint   Patient presents with    Annual Exam       History and Physical:  Patient's last menstrual period was 2024.    Contraception: OCP's (20 mcg)    Date: /    Josefa Oliver is a 29 y.o.  who presents today for her routine annual GYN exam.     Gynecologic issues:  Vaginal discharge and occasional irregular bleeding.    Pap smear history:  Positive Hx Abnormal pap smear..  Last Pap smear: 2023    HPV vaccine: 2/3 received today    Personal or family history of bleeding or blood clotting disorders:  Negative     Family history:  Breast cancer:  Negative  Colon cancer:  Negative  POSITIVE - Paternal GF  Gyn related cancer:  Negative    The patient reports no changes in her medical or surgical history since her last evaluation.    Allergies:   Review of patient's allergies indicates:   Allergen Reactions    Clindamycin Other (See Comments) and Rash       Past Medical History:   Diagnosis Date    Abnormal Pap smear of cervix     Anxiety disorder, unspecified     Depression     Fall (on) (from) other stairs and steps, initial encounter 2023    Mental disorder        History reviewed. No pertinent surgical history.    MEDS:   Current Outpatient Medications:     dextroamphetamine-amphetamine (ADDERALL XR) 20 MG 24 hr capsule, Take 20 mg by mouth., Disp: , Rfl:     ondansetron (ZOFRAN-ODT) 4 MG TbDL, Take 1 tablet (4 mg total) by mouth every 6 (six) hours as needed (Nausea)., Disp: 20 tablet, Rfl: 0    prenatal vit/iron fum/folic ac (PRENATAL 1+1 ORAL), Take 1 tablet by mouth every evening., Disp: , Rfl:     norethindrone-ethinyl estradiol (MICROGESTIN ) 1-20 mg-mcg per tablet, Take 1 tablet by mouth once daily., Disp: 90 tablet, Rfl: 3  No current facility-administered medications for this visit.     OB History          4    Para   4    Term   4            AB        Living   3         SAB        IAB        Ectopic        Multiple   0    Live  "Births   4           Obstetric Comments   Vaginal delivery x 3               Age of Menarche:12  Age at first pregnancy:19   Age at first live birth:20  Number of months breastfeeding:    Age at Menopause:    Comments:       Social History     Tobacco Use    Smoking status: Former     Types: Cigarettes, Vaping with nicotine    Smokeless tobacco: Never   Substance Use Topics    Alcohol use: Not Currently    Drug use: Never       Family History   Problem Relation Name Age of Onset    Hypertension Mother      Diabetes Mother      Drug/alcohol assessment Mother      Gestational diabetes Sister         Past medical and surgical history reviewed.   I have reviewed the patient's medical history in detail and updated the computerized patient record.    Review of Systems (at today's evaluation)  Review of Systems   Constitutional:  Negative for fever and unexpected weight change.   HENT:  Negative for congestion, ear pain, nosebleeds, sinus pain and sore throat.    Eyes: Negative.    Respiratory:  Negative for cough and shortness of breath.    Cardiovascular:  Negative for chest pain and palpitations.   Gastrointestinal:  Negative for constipation, diarrhea, nausea and vomiting.   Endocrine: Negative.    Genitourinary:  Negative for dysuria, frequency, hematuria and urgency.        Gyn as per HPI   Musculoskeletal:  Negative for arthralgias and myalgias.   Skin:  Negative for rash.   Neurological:  Negative for weakness and headaches.   Psychiatric/Behavioral:  The patient is not nervous/anxious.         Physical Exam:   /70 (BP Location: Right arm, Patient Position: Sitting, BP Method: Medium (Manual))   Ht 5' 1" (1.549 m)   Wt 49.1 kg (108 lb 3.9 oz)   LMP 09/08/2024   BMI 20.45 kg/m²     Physical Exam:   Constitutional: She appears well-developed and well-nourished. No distress.    HENT:   Head: Normocephalic.     Neck: No thyroid mass present.    Cardiovascular:  Normal rate.             Pulmonary/Chest: Effort " normal. No respiratory distress.        Abdominal: Soft. There is no abdominal tenderness.     Genitourinary:    Inguinal canal, vagina, uterus, right adnexa and left adnexa normal.      Pelvic exam was performed with patient supine.   The external female genitalia was normal.   No external genitalia lesions identified,Cervix is normal. Right adnexum displays no mass and no tenderness. Left adnexum displays no mass and no tenderness. No tenderness or bleeding in the vagina. Uterus is not tender. Normal urethral meatus.Urethra findings: no tendernessBladder findings: no bladder tenderness   Genitourinary Comments: A chaperone (female medical assistant) was present throughout the pelvic exam.             Musculoskeletal: Normal range of motion.      Right lower leg: No edema.      Left lower leg: No edema.      Lymphadenopathy: No inguinal adenopathy noted on the right or left side.    Neurological: She is alert.    Skin: Skin is warm and dry.    Psychiatric: She has a normal mood and affect. Mood normal.        Assessment:        1. Well woman exam with routine gynecological exam    2. Screening for malignant neoplasm of cervix    3. HPV vaccine counseling    4. Vaginal discharge    5. Encounter for counseling regarding contraception         Plan:      Well woman exam with routine gynecological exam    Screening for malignant neoplasm of cervix  -     HPV High Risk Genotypes, PCR  -     Liquid-Based Pap Smear, Screening    HPV vaccine counseling  -     VFC-hpv vaccine,9-iván (GARDASIL 9) vaccine 0.5 mL    Vaginal discharge  -     Vaginosis Screen by DNA Probe  -     C. trachomatis/N. gonorrhoeae by AMP DNA [facility]; Cervicovaginal    Encounter for counseling regarding contraception  -     norethindrone-ethinyl estradiol (MICROGESTIN 1/20) 1-20 mg-mcg per tablet; Take 1 tablet by mouth once daily.  Dispense: 90 tablet; Refill: 3       Follow up For 3rd dose of Gardasil, for as needed / for any GYN related issues.      The above was reviewed and discussed with the patient.    Annual exam and screening issues based on the patient's age, medical history and family history were reviewed / discussed.  Routine health maintenance issues were reviewed and discussed.      The patient's current birth control was discussed.  We discussed the fact that she may be having some irregular bleeding due to the low-dose OCP but she is comfortable on her current medication and does not desire dose adjustments.  A refill was provided.  The pros, cons, risks, benefits, alternatives and indications of the medication(s) prescribed, as well as appropriate use and potential side effects were discussed.    Pap smear as well as gonorrhea chlamydia and vaginitis testing were obtained.  The patient received her 2nd dose of the Gardasil vaccine today without difficulty.    The patient's questions were answered, and she is in agreement with the current plan.     Pilo Peng MD  Department OBGYN [facility]

## 2024-09-20 LAB
FINAL PATHOLOGIC DIAGNOSIS: NORMAL
HPV HR 12 DNA SPEC QL NAA+PROBE: POSITIVE
HPV16 AG SPEC QL: NEGATIVE
HPV18 DNA SPEC QL NAA+PROBE: NEGATIVE
Lab: NORMAL

## 2025-01-16 ENCOUNTER — CLINICAL SUPPORT (OUTPATIENT)
Dept: OBSTETRICS AND GYNECOLOGY | Facility: CLINIC | Age: 30
End: 2025-01-16
Payer: MEDICAID

## 2025-01-16 ENCOUNTER — TELEPHONE (OUTPATIENT)
Dept: OBSTETRICS AND GYNECOLOGY | Facility: CLINIC | Age: 30
End: 2025-01-16
Payer: MEDICAID

## 2025-01-16 DIAGNOSIS — Z23 NEED FOR HPV VACCINATION: Primary | ICD-10-CM

## 2025-01-16 PROCEDURE — 99999PBSHW PR PBB SHADOW TECHNICAL ONLY FILED TO HB: Mod: PBBFAC,,,

## 2025-01-16 PROCEDURE — 90651 9VHPV VACCINE 2/3 DOSE IM: CPT | Mod: PBBFAC,PO

## 2025-01-16 PROCEDURE — 90471 IMMUNIZATION ADMIN: CPT | Mod: PBBFAC,PO

## 2025-01-16 RX ADMIN — HUMAN PAPILLOMAVIRUS 9-VALENT VACCINE, RECOMBINANT 0.5 ML: 30; 40; 60; 40; 20; 20; 20; 20; 20 INJECTION, SUSPENSION INTRAMUSCULAR at 04:01

## 2025-01-16 NOTE — TELEPHONE ENCOUNTER
----- Message from Fabby sent at 1/16/2025  2:11 PM CST -----  Contact: Josefa  Patient unable to make today's nurse's visit. Needing a call back at .[phone], to be rescheduled. Had to leave to get son, can come back today at a later time if possible.

## 2025-02-15 ENCOUNTER — OFFICE VISIT (OUTPATIENT)
Dept: URGENT CARE | Facility: CLINIC | Age: 30
End: 2025-02-15
Payer: MEDICAID

## 2025-02-15 VITALS
RESPIRATION RATE: 19 BRPM | TEMPERATURE: 98 F | HEART RATE: 98 BPM | WEIGHT: 105.63 LBS | BODY MASS INDEX: 19.94 KG/M2 | OXYGEN SATURATION: 98 % | DIASTOLIC BLOOD PRESSURE: 85 MMHG | SYSTOLIC BLOOD PRESSURE: 121 MMHG | HEIGHT: 61 IN

## 2025-02-15 DIAGNOSIS — B96.89 ACUTE BACTERIAL SINUSITIS: Primary | ICD-10-CM

## 2025-02-15 DIAGNOSIS — H65.93 MIDDLE EAR EFFUSION, BILATERAL: ICD-10-CM

## 2025-02-15 DIAGNOSIS — J01.90 ACUTE BACTERIAL SINUSITIS: Primary | ICD-10-CM

## 2025-02-15 RX ORDER — FLUTICASONE PROPIONATE 50 MCG
2 SPRAY, SUSPENSION (ML) NASAL DAILY
Qty: 15.8 ML | Refills: 0 | Status: SHIPPED | OUTPATIENT
Start: 2025-02-15

## 2025-02-15 RX ORDER — DEXAMETHASONE SODIUM PHOSPHATE 4 MG/ML
8 INJECTION, SOLUTION INTRA-ARTICULAR; INTRALESIONAL; INTRAMUSCULAR; INTRAVENOUS; SOFT TISSUE
Status: COMPLETED | OUTPATIENT
Start: 2025-02-15 | End: 2025-02-15

## 2025-02-15 RX ORDER — AMOXICILLIN AND CLAVULANATE POTASSIUM 875; 125 MG/1; MG/1
1 TABLET, FILM COATED ORAL EVERY 12 HOURS
Qty: 20 TABLET | Refills: 0 | Status: SHIPPED | OUTPATIENT
Start: 2025-02-15 | End: 2025-02-25

## 2025-02-15 RX ORDER — ONDANSETRON 4 MG/1
TABLET, FILM COATED ORAL 2 TIMES DAILY
COMMUNITY
Start: 2024-09-09

## 2025-02-15 RX ORDER — ONDANSETRON 4 MG/1
4 TABLET, ORALLY DISINTEGRATING ORAL EVERY 6 HOURS PRN
Qty: 20 TABLET | Refills: 0 | Status: SHIPPED | OUTPATIENT
Start: 2025-02-15

## 2025-02-15 RX ORDER — ESCITALOPRAM OXALATE 20 MG/1
20 TABLET ORAL
COMMUNITY
Start: 2025-02-04 | End: 2025-08-03

## 2025-02-15 RX ORDER — LORATADINE PSEUDOEPHEDRINE SULFATE 10; 240 MG/1; MG/1
1 TABLET, EXTENDED RELEASE ORAL DAILY
COMMUNITY
Start: 2025-02-15 | End: 2025-02-25

## 2025-02-15 RX ORDER — PROPRANOLOL HYDROCHLORIDE 10 MG/1
10 TABLET ORAL
COMMUNITY
Start: 2025-02-04 | End: 2025-05-05

## 2025-02-15 RX ADMIN — DEXAMETHASONE SODIUM PHOSPHATE 8 MG: 4 INJECTION, SOLUTION INTRA-ARTICULAR; INTRALESIONAL; INTRAMUSCULAR; INTRAVENOUS; SOFT TISSUE at 04:02

## 2025-02-15 NOTE — PROGRESS NOTES
"Subjective:      Patient ID: Josefa Oliver is a 29 y.o. female.    Vitals:  height is 5' 1" (1.549 m) and weight is 47.9 kg (105 lb 9.6 oz). Her temperature is 98.1 °F (36.7 °C). Her blood pressure is 121/85 and her pulse is 98. Her respiration is 19 and oxygen saturation is 98%.     Chief Complaint: Fever    Patient is a 29-year-old female who presents to clinic for evaluation of fever and sinus with GI related symptoms.  Patient states that the beginning of this week or end of last week she had stomach bug related symptoms.  Patient states was exposed to other individuals to that.  Patient states that has resolved now.  Patient states remains with more so sinus related per pressure.  Patient states that she initially had a fever with a temperature max of 103° F however has not had this in a few days now.  Patient reports that she is vaccinated for flu however not COVID.  Patient states has taken over-the-counter cold and flu along with Tylenol and Motrin with mild relief to symptoms.    Fever   This is a new problem. The current episode started 4-13 days (x's 7 days). The problem has been waxing and waning. The maximum temperature noted was 103 to 103.9 F. Associated symptoms include diarrhea, ear pain, headaches, nausea and vomiting. Pertinent negatives include no abdominal pain, chest pain, congestion, coughing, rash or sore throat. She has tried acetaminophen and NSAIDs for the symptoms. The treatment provided no relief.       Constitution: Positive for chills, fatigue and fever (Temperature max 103F).   HENT:  Positive for ear pain. Negative for ear discharge, tinnitus, hearing loss, congestion and sore throat.    Neck: neck negative.   Cardiovascular: Negative.  Negative for chest pain and palpitations.   Eyes: Negative.    Respiratory: Negative.  Negative for chest tightness, cough and shortness of breath.    Gastrointestinal:  Positive for nausea, vomiting and diarrhea. Negative for abdominal pain. "   Endocrine: negative.   Genitourinary: Negative.  Negative for dysuria, frequency and urgency.   Musculoskeletal: Negative.  Negative for muscle ache.   Skin: Negative.  Negative for color change, pale, rash and erythema.   Allergic/Immunologic: Negative.    Neurological:  Positive for headaches. Negative for dizziness, light-headedness, passing out, disorientation and altered mental status.   Hematologic/Lymphatic: Negative.    Psychiatric/Behavioral: Negative.  Negative for altered mental status, disorientation and confusion.       Objective:     Physical Exam   Constitutional: She is oriented to person, place, and time. She appears well-developed. She is cooperative.  Non-toxic appearance. She does not appear ill. No distress.   HENT:   Head: Normocephalic and atraumatic.   Ears:   Right Ear: Hearing, external ear and ear canal normal. A middle ear effusion is present.   Left Ear: Hearing, external ear and ear canal normal. A middle ear effusion is present.   Nose: Congestion present. No mucosal edema, rhinorrhea or nasal deformity. No epistaxis. Right sinus exhibits maxillary sinus tenderness. Right sinus exhibits no frontal sinus tenderness. Left sinus exhibits maxillary sinus tenderness. Left sinus exhibits no frontal sinus tenderness.   Mouth/Throat: Uvula is midline, oropharynx is clear and moist and mucous membranes are normal. Mucous membranes are moist. No trismus in the jaw. Normal dentition. No uvula swelling. No oropharyngeal exudate or posterior oropharyngeal erythema. Oropharynx is clear.   Eyes: Conjunctivae and lids are normal. Pupils are equal, round, and reactive to light. Right eye exhibits no discharge. Left eye exhibits no discharge. No scleral icterus.   Neck: Trachea normal and phonation normal. Neck supple. No neck rigidity present.   Cardiovascular: Normal rate, regular rhythm, normal heart sounds and normal pulses.   Pulmonary/Chest: Effort normal and breath sounds normal. No respiratory  distress. She has no wheezes. She has no rhonchi. She has no rales.   Abdominal: Normal appearance and bowel sounds are normal. She exhibits no distension. Soft. There is no abdominal tenderness. There is no rebound, no guarding, no left CVA tenderness and no right CVA tenderness.   Musculoskeletal: Normal range of motion.         General: Normal range of motion.      Cervical back: She exhibits no tenderness.   Lymphadenopathy:     She has no cervical adenopathy.   Neurological: She is alert and oriented to person, place, and time. She displays no weakness. She exhibits normal muscle tone. Gait normal.   Skin: Skin is warm, dry, intact, not diaphoretic, not pale and no rash. Capillary refill takes less than 2 seconds. No erythema   Psychiatric: Her speech is normal and behavior is normal. Judgment and thought content normal.   Nursing note and vitals reviewed.      Assessment:     1. Acute bacterial sinusitis    2. Middle ear effusion, bilateral        Plan:       Acute bacterial sinusitis    Middle ear effusion, bilateral    Other orders  -     dexAMETHasone injection 8 mg  -     amoxicillin-clavulanate 875-125mg (AUGMENTIN) 875-125 mg per tablet; Take 1 tablet by mouth every 12 (twelve) hours. for 10 days  Dispense: 20 tablet; Refill: 0  -     loratadine-pseudoephedrine  mg (CLARITIN-D 24 HOUR)  mg per 24 hr tablet; Take 1 tablet by mouth once daily. for 10 days  -     fluticasone propionate (FLONASE) 50 mcg/actuation nasal spray; 2 sprays (100 mcg total) by Each Nostril route once daily.  Dispense: 15.8 mL; Refill: 0  -     ondansetron (ZOFRAN-ODT) 4 MG TbDL; Take 1 tablet (4 mg total) by mouth every 6 (six) hours as needed (Nausea).  Dispense: 20 tablet; Refill: 0                Labs:  Patient refused point of care testing.    Decadron 8 mg IM in clinic.  Patient tolerated well.  No complications noted.  Take medications as prescribed.  Tylenol/Motrin per package instructions for any pain or  fever.  Assure adequate hydration and rest.  Throat lozenges or Chloraseptic per package instructions for sore throat.    Warm salt water gargles every 2-3 hours as needed for sore throat.    Nasal saline flushes or irrigation as directed for nasal saline congestion and sinus related symptoms.  Follow-up with PCP in 1-2 days.  Return to clinic as needed.  To ED for any new or acutely worsening symptoms.  Patient in agreement with plan of care.    DISCLAIMER: Please note that my documentation in this Electronic Healthcare Record was produced using speech recognition software and therefore may contain errors related to that software system.These could include grammar, punctuation and spelling errors or the inclusion/exclusion of phrases that were not intended. Garbled syntax, mangled pronouns, and other bizarre constructions may be attributed to that software system.